# Patient Record
Sex: MALE | Race: WHITE | NOT HISPANIC OR LATINO | Employment: OTHER | URBAN - NONMETROPOLITAN AREA
[De-identification: names, ages, dates, MRNs, and addresses within clinical notes are randomized per-mention and may not be internally consistent; named-entity substitution may affect disease eponyms.]

---

## 2023-05-02 PROBLEM — M79.661 BILATERAL CALF PAIN: Status: ACTIVE | Noted: 2023-05-02

## 2023-05-02 PROBLEM — C61 MALIGNANT NEOPLASM OF PROSTATE (MULTI): Status: ACTIVE | Noted: 2023-05-02

## 2023-05-02 PROBLEM — H61.23 BILATERAL IMPACTED CERUMEN: Status: ACTIVE | Noted: 2023-05-02

## 2023-05-02 PROBLEM — E66.9 OBESITY: Status: ACTIVE | Noted: 2023-05-02

## 2023-05-02 PROBLEM — Z86.010 HISTORY OF ADENOMATOUS POLYP OF COLON: Status: ACTIVE | Noted: 2023-05-02

## 2023-05-02 PROBLEM — Z86.0101 HISTORY OF ADENOMATOUS POLYP OF COLON: Status: ACTIVE | Noted: 2023-05-02

## 2023-05-02 PROBLEM — E11.9 DM W/O COMPLICATION TYPE II (MULTI): Status: ACTIVE | Noted: 2023-05-02

## 2023-05-02 PROBLEM — E55.9 VITAMIN D DEFICIENCY: Status: ACTIVE | Noted: 2023-05-02

## 2023-05-02 PROBLEM — I25.10 MULTI-VESSEL CORONARY ARTERY STENOSIS: Status: ACTIVE | Noted: 2023-05-02

## 2023-05-02 PROBLEM — Z95.1 S/P CABG X 5: Status: ACTIVE | Noted: 2023-05-02

## 2023-05-02 PROBLEM — M79.662 BILATERAL CALF PAIN: Status: ACTIVE | Noted: 2023-05-02

## 2023-05-02 PROBLEM — S98.132A AMPUTATION OF TOE OF LEFT FOOT (CMS-HCC): Status: ACTIVE | Noted: 2023-05-02

## 2023-05-02 PROBLEM — N52.9 ED (ERECTILE DYSFUNCTION): Status: ACTIVE | Noted: 2023-05-02

## 2023-05-02 PROBLEM — T88.7XXA MEDICATION SIDE EFFECT: Status: ACTIVE | Noted: 2023-05-02

## 2023-05-02 PROBLEM — H91.90 HEARING LOSS: Status: ACTIVE | Noted: 2023-05-02

## 2023-05-02 PROBLEM — H91.93 DECREASED HEARING OF BOTH EARS: Status: ACTIVE | Noted: 2023-05-02

## 2023-05-02 PROBLEM — G25.0 ESSENTIAL TREMOR: Status: ACTIVE | Noted: 2023-05-02

## 2023-05-02 PROBLEM — I10 HYPERTENSION: Status: ACTIVE | Noted: 2023-05-02

## 2023-05-02 PROBLEM — B35.1 ONYCHOMYCOSIS: Status: ACTIVE | Noted: 2023-05-02

## 2023-05-02 PROBLEM — J30.9 ALLERGIC RHINITIS: Status: ACTIVE | Noted: 2023-05-02

## 2023-05-02 PROBLEM — N18.9 CRF (CHRONIC RENAL FAILURE): Status: ACTIVE | Noted: 2023-05-02

## 2023-05-02 PROBLEM — E78.5 HYPERLIPIDEMIA: Status: ACTIVE | Noted: 2023-05-02

## 2023-05-02 PROBLEM — E11.9 DIABETES MELLITUS (MULTI): Status: ACTIVE | Noted: 2023-05-02

## 2023-05-02 PROBLEM — R25.1 TREMOR: Status: ACTIVE | Noted: 2023-05-02

## 2023-05-02 RX ORDER — ACETAMINOPHEN 325 MG/1
TABLET ORAL EVERY 6 HOURS PRN
COMMUNITY
Start: 2020-03-08 | End: 2023-05-03 | Stop reason: ALTCHOICE

## 2023-05-02 RX ORDER — FLASH GLUCOSE SENSOR
KIT MISCELLANEOUS
COMMUNITY
Start: 2023-03-21

## 2023-05-02 RX ORDER — INSULIN LISPRO 100 [IU]/ML
INJECTION, SOLUTION INTRAVENOUS; SUBCUTANEOUS
COMMUNITY
Start: 2020-03-16 | End: 2023-08-09 | Stop reason: SDUPTHER

## 2023-05-02 RX ORDER — METOPROLOL TARTRATE 25 MG/1
TABLET, FILM COATED ORAL
COMMUNITY
Start: 2020-03-16 | End: 2023-08-09 | Stop reason: SDUPTHER

## 2023-05-02 RX ORDER — MULTIVITAMIN
TABLET ORAL
COMMUNITY

## 2023-05-02 RX ORDER — INSULIN GLARGINE 100 [IU]/ML
INJECTION, SOLUTION SUBCUTANEOUS
COMMUNITY
End: 2023-08-09 | Stop reason: SDUPTHER

## 2023-05-02 RX ORDER — LISINOPRIL 20 MG/1
TABLET ORAL
COMMUNITY
Start: 2020-03-16

## 2023-05-02 RX ORDER — ISOSORBIDE MONONITRATE 30 MG/1
TABLET, EXTENDED RELEASE ORAL
COMMUNITY
Start: 2020-03-08 | End: 2023-05-03 | Stop reason: ALTCHOICE

## 2023-05-02 RX ORDER — CEPHALEXIN 500 MG/1
500 CAPSULE ORAL 3 TIMES DAILY
COMMUNITY
Start: 2022-08-03 | End: 2023-05-03 | Stop reason: ALTCHOICE

## 2023-05-02 RX ORDER — METOPROLOL TARTRATE 100 MG/1
100 TABLET ORAL 2 TIMES DAILY
COMMUNITY
Start: 2020-04-01

## 2023-05-02 RX ORDER — POLYETHYLENE GLYCOL 3350 17 G/17G
17 POWDER, FOR SOLUTION ORAL
COMMUNITY
Start: 2020-03-08 | End: 2023-05-03 | Stop reason: ALTCHOICE

## 2023-05-02 RX ORDER — METFORMIN HYDROCHLORIDE 1000 MG/1
1 TABLET ORAL 2 TIMES DAILY
COMMUNITY
Start: 2019-12-11 | End: 2023-12-01

## 2023-05-02 RX ORDER — BISACODYL 10 MG/1
SUPPOSITORY RECTAL
COMMUNITY
Start: 2020-03-08 | End: 2023-05-03 | Stop reason: ALTCHOICE

## 2023-05-02 RX ORDER — ACETAMINOPHEN AND CODEINE PHOSPHATE 300; 30 MG/1; MG/1
TABLET ORAL
COMMUNITY
Start: 2023-01-25 | End: 2023-05-03 | Stop reason: ALTCHOICE

## 2023-05-02 RX ORDER — ACETAMINOPHEN 500 MG
TABLET ORAL
COMMUNITY

## 2023-05-02 RX ORDER — FUROSEMIDE 20 MG/1
1 TABLET ORAL DAILY
COMMUNITY
Start: 2020-03-16 | End: 2023-08-09 | Stop reason: SDUPTHER

## 2023-05-02 RX ORDER — CETIRIZINE HYDROCHLORIDE 10 MG/1
1 TABLET ORAL NIGHTLY
COMMUNITY
End: 2023-11-09 | Stop reason: SDUPTHER

## 2023-05-02 RX ORDER — IRON POLYSACCHARIDE COMPLEX 150 MG
CAPSULE ORAL
COMMUNITY
Start: 2020-03-08 | End: 2023-05-03 | Stop reason: ALTCHOICE

## 2023-05-02 RX ORDER — FUROSEMIDE 40 MG/1
1 TABLET ORAL DAILY
COMMUNITY

## 2023-05-02 RX ORDER — PRIMIDONE 50 MG/1
4 TABLET ORAL NIGHTLY
COMMUNITY
Start: 2019-10-16 | End: 2023-08-09 | Stop reason: ALTCHOICE

## 2023-05-02 RX ORDER — PEN NEEDLE, DIABETIC 32GX 5/32"
1 NEEDLE, DISPOSABLE MISCELLANEOUS
COMMUNITY
Start: 2023-03-27

## 2023-05-02 RX ORDER — CLOPIDOGREL BISULFATE 75 MG/1
1 TABLET ORAL DAILY
COMMUNITY
Start: 2013-01-04

## 2023-05-02 RX ORDER — DOCUSATE SODIUM 100 MG/1
CAPSULE, LIQUID FILLED ORAL
COMMUNITY

## 2023-05-02 RX ORDER — SODIUM FLUORIDE 6 MG/ML
PASTE, DENTIFRICE DENTAL
COMMUNITY
Start: 2022-10-11

## 2023-05-02 RX ORDER — ATORVASTATIN CALCIUM 80 MG/1
TABLET, FILM COATED ORAL
COMMUNITY
Start: 2020-03-08

## 2023-05-02 RX ORDER — INSULIN ASPART 100 [IU]/ML
INJECTION, SOLUTION INTRAVENOUS; SUBCUTANEOUS
COMMUNITY
Start: 2020-07-14 | End: 2024-02-11

## 2023-05-02 RX ORDER — DOXYCYCLINE 100 MG/1
CAPSULE ORAL
COMMUNITY
Start: 2022-08-24 | End: 2023-05-03 | Stop reason: ALTCHOICE

## 2023-05-03 ENCOUNTER — OFFICE VISIT (OUTPATIENT)
Dept: PRIMARY CARE | Facility: CLINIC | Age: 73
End: 2023-05-03
Payer: MEDICARE

## 2023-05-03 VITALS
OXYGEN SATURATION: 97 % | WEIGHT: 222.6 LBS | SYSTOLIC BLOOD PRESSURE: 112 MMHG | BODY MASS INDEX: 31.05 KG/M2 | DIASTOLIC BLOOD PRESSURE: 56 MMHG | HEART RATE: 64 BPM

## 2023-05-03 DIAGNOSIS — Z00.00 ANNUAL PHYSICAL EXAM: Primary | ICD-10-CM

## 2023-05-03 DIAGNOSIS — E11.40 TYPE 2 DIABETES MELLITUS WITH DIABETIC NEUROPATHY, WITHOUT LONG-TERM CURRENT USE OF INSULIN (MULTI): ICD-10-CM

## 2023-05-03 DIAGNOSIS — I11.0 HYPERTENSIVE HEART DISEASE WITH HEART FAILURE (MULTI): ICD-10-CM

## 2023-05-03 DIAGNOSIS — E55.9 VITAMIN D DEFICIENCY: ICD-10-CM

## 2023-05-03 DIAGNOSIS — Z85.528 PERSONAL HISTORY OF RENAL CANCER: ICD-10-CM

## 2023-05-03 DIAGNOSIS — G47.33 OBSTRUCTIVE SLEEP APNEA (ADULT) (PEDIATRIC): ICD-10-CM

## 2023-05-03 DIAGNOSIS — E78.5 HYPERLIPIDEMIA, UNSPECIFIED HYPERLIPIDEMIA TYPE: ICD-10-CM

## 2023-05-03 DIAGNOSIS — I25.10 MULTI-VESSEL CORONARY ARTERY STENOSIS: ICD-10-CM

## 2023-05-03 DIAGNOSIS — E66.09 CLASS 1 OBESITY DUE TO EXCESS CALORIES WITH SERIOUS COMORBIDITY AND BODY MASS INDEX (BMI) OF 31.0 TO 31.9 IN ADULT: ICD-10-CM

## 2023-05-03 DIAGNOSIS — C61 MALIGNANT NEOPLASM OF PROSTATE (MULTI): ICD-10-CM

## 2023-05-03 DIAGNOSIS — S98.132A AMPUTATION OF TOE OF LEFT FOOT (CMS-HCC): ICD-10-CM

## 2023-05-03 DIAGNOSIS — G25.0 ESSENTIAL TREMOR: ICD-10-CM

## 2023-05-03 PROBLEM — R53.1 WEAKNESS: Status: ACTIVE | Noted: 2020-03-16

## 2023-05-03 PROBLEM — R26.2 DIFFICULTY IN WALKING, NOT ELSEWHERE CLASSIFIED: Status: ACTIVE | Noted: 2020-03-16

## 2023-05-03 PROBLEM — L97.522 ULCER OF LEFT FOOT, WITH FAT LAYER EXPOSED (MULTI): Status: ACTIVE | Noted: 2021-07-07

## 2023-05-03 PROBLEM — L03.032 CELLULITIS OF LEFT TOE: Status: ACTIVE | Noted: 2021-07-14

## 2023-05-03 PROBLEM — L97.512: Status: ACTIVE | Noted: 2021-07-07

## 2023-05-03 PROBLEM — Z85.46 PERSONAL HISTORY OF MALIGNANT NEOPLASM OF PROSTATE: Status: ACTIVE | Noted: 2020-03-16

## 2023-05-03 PROBLEM — K21.9 GASTRO-ESOPHAGEAL REFLUX DISEASE WITHOUT ESOPHAGITIS: Status: ACTIVE | Noted: 2020-03-16

## 2023-05-03 PROBLEM — Z95.1 PRESENCE OF AORTOCORONARY BYPASS GRAFT: Status: ACTIVE | Noted: 2020-03-16

## 2023-05-03 PROCEDURE — 4010F ACE/ARB THERAPY RXD/TAKEN: CPT | Performed by: INTERNAL MEDICINE

## 2023-05-03 PROCEDURE — 3074F SYST BP LT 130 MM HG: CPT | Performed by: INTERNAL MEDICINE

## 2023-05-03 PROCEDURE — 1159F MED LIST DOCD IN RCRD: CPT | Performed by: INTERNAL MEDICINE

## 2023-05-03 PROCEDURE — 99397 PER PM REEVAL EST PAT 65+ YR: CPT | Performed by: INTERNAL MEDICINE

## 2023-05-03 PROCEDURE — 1157F ADVNC CARE PLAN IN RCRD: CPT | Performed by: INTERNAL MEDICINE

## 2023-05-03 PROCEDURE — 99215 OFFICE O/P EST HI 40 MIN: CPT | Performed by: INTERNAL MEDICINE

## 2023-05-03 PROCEDURE — 3008F BODY MASS INDEX DOCD: CPT | Performed by: INTERNAL MEDICINE

## 2023-05-03 PROCEDURE — 1036F TOBACCO NON-USER: CPT | Performed by: INTERNAL MEDICINE

## 2023-05-03 PROCEDURE — 1160F RVW MEDS BY RX/DR IN RCRD: CPT | Performed by: INTERNAL MEDICINE

## 2023-05-03 PROCEDURE — 3078F DIAST BP <80 MM HG: CPT | Performed by: INTERNAL MEDICINE

## 2023-05-03 RX ORDER — ASPIRIN 81 MG/1
81 TABLET ORAL DAILY
COMMUNITY
End: 2023-08-09 | Stop reason: SDUPTHER

## 2023-05-03 ASSESSMENT — ENCOUNTER SYMPTOMS: HYPERTENSION: 1

## 2023-05-03 NOTE — PROGRESS NOTES
Subjective   Patient ID: Gabo Weiner is a 73 y.o. male who presents for yearly physical / Diabetes Mellitus, Hyperlipidemia, Hypertension, and Foot Problem (Left big toe and 2nd toe amputation, discussion).  Hyperlipidemia    Hypertension    Yearly physical    Prostate 8-22  Colonoscopy 2019 recheck 2024  CT chest lung cancer screening n/a  immunizations rev'd  BMI 31    Follow up    CAD / cholesterol stable on rx no side effects  cardio following / Dr Wylie     Hand shaking tremor is worse on rx dose increased no side effects  neuro following / Dr Schroeder     DM stable on rx no side effects  FBS 95 this am  neuropathy, microalbuminuria  check blood sugars per endo  endo following  / Dr Moncada  podiatry following / Dr Tavares left foot 2nd toe amputated due to PVD  Had foot vs opened / vasc following     hypertension stable on rx no side effects     vit D stable on rx no side effects     renal cancer and prostate cancer stable  urology following / Dr Paul     diet / exercise rev'd    Review of Systems   All other systems reviewed and are negative.      Objective   /56   Pulse 64   Wt 101 kg (222 lb 9.6 oz)   SpO2 97%   BMI 31.05 kg/m²   Lab Results   Component Value Date    WBC 8.4 08/08/2022    HGB 16.7 08/08/2022    HCT 49.4 08/08/2022     08/08/2022    CHOL 122 08/08/2022    TRIG 191 (H) 08/08/2022    HDL 36.0 (A) 08/08/2022    ALT 21 08/08/2022    AST 21 08/08/2022     08/08/2022    K 3.9 08/08/2022     08/08/2022    CREATININE 0.84 08/08/2022    BUN 18 08/08/2022    CO2 29 08/08/2022    TSH 1.37 08/08/2022    INR 1.1 03/08/2020    HGBA1C 6.8 (A) 08/08/2022    ALBUR 139.3 12/15/2017           Physical Exam  Vitals reviewed.   Constitutional:       Appearance: Normal appearance. He is obese.   HENT:      Head: Normocephalic and atraumatic.      Mouth/Throat:      Pharynx: No posterior oropharyngeal erythema.   Eyes:      General: No scleral icterus.     Conjunctiva/sclera:  Conjunctivae normal.      Pupils: Pupils are equal, round, and reactive to light.   Cardiovascular:      Rate and Rhythm: Normal rate and regular rhythm.      Heart sounds: Normal heart sounds.   Pulmonary:      Effort: No respiratory distress.      Breath sounds: No wheezing.   Abdominal:      General: Abdomen is flat. Bowel sounds are normal. There is no distension.      Palpations: Abdomen is soft. There is no mass.      Tenderness: There is no abdominal tenderness. There is no rebound.   Musculoskeletal:         General: Normal range of motion.      Cervical back: Normal range of motion and neck supple.      Comments: Left foot 2 nd toe amputated   Clean and dry    DP 1 +  warm   Skin:     General: Skin is warm and dry.   Neurological:      General: No focal deficit present.      Mental Status: He is alert and oriented to person, place, and time. Mental status is at baseline.   Psychiatric:         Mood and Affect: Mood normal.         Behavior: Behavior normal.         Thought Content: Thought content normal.         Judgment: Judgment normal.         Problem List Items Addressed This Visit          Nervous    Essential tremor    Obstructive sleep apnea (adult) (pediatric)    Type 2 diabetes mellitus with diabetic neuropathy, without long-term current use of insulin (CMS/[facility])    Relevant Orders    Albumin , Urine Random    Hemoglobin A1C       Circulatory    Multi-vessel coronary artery stenosis    Hypertensive heart disease with heart failure (CMS/HCC)       Genitourinary    Malignant neoplasm of prostate (CMS/HCC)       Musculoskeletal    Amputation of toe of left foot (CMS/[facility])       Endocrine/Metabolic    Obesity    Vitamin D deficiency    Relevant Orders    Vitamin D 25 hydroxy       Other    Hyperlipidemia, unspecified    Relevant Orders    Comprehensive Metabolic Panel    Lipid Panel    TSH with reflex to Free T4 if abnormal     Other Visit Diagnoses       Annual physical exam    -  Primary    Personal  history of renal cancer        Relevant Orders    CBC and Auto Differential          Assessment/Plan     Yearly physical    Prostate 8-22  Colonoscopy 2019 recheck 2024  CT chest lung cancer screening n/a  immunizations rev'd  BMI 31    Follow up    CAD / cholesterol stable on rx no side effects  cardio following / Dr Wylie     Hand shaking tremor is worse on rx dose increased no side effects  neuro following / Dr Schroeder     DM stable on rx no side effects  FBS 95 this am  neuropathy, microalbuminuria  check blood sugars per endo  endo following  / Dr Moncada  podiatry following / Dr Tavares left foot 2nd toe amputated due to PVD  Had foot vs opened / vasc following     hypertension stable on rx no side effects     vit D stable on rx no side effects     renal cancer and prostate cancer stable  urology following / Dr Paul     diet / exercise rev'd    Check labs before appt  Prostate in 8-23    Follow up 3 months / annual wellness

## 2023-07-25 PROBLEM — E11.9 TYPE 2 DIABETES MELLITUS WITHOUT COMPLICATIONS (MULTI): Status: ACTIVE | Noted: 2020-03-16

## 2023-07-25 RX ORDER — ASPIRIN 81 MG/1
TABLET ORAL EVERY 24 HOURS
COMMUNITY
Start: 2013-09-06

## 2023-07-25 RX ORDER — AMOXICILLIN AND CLAVULANATE POTASSIUM 875; 125 MG/1; MG/1
1 TABLET, FILM COATED ORAL
COMMUNITY
Start: 2023-03-15 | End: 2023-08-09 | Stop reason: ALTCHOICE

## 2023-07-25 RX ORDER — CHOLECALCIFEROL (VITAMIN D3) 50 MCG
TABLET ORAL
COMMUNITY
End: 2023-08-09 | Stop reason: SDUPTHER

## 2023-07-31 ENCOUNTER — LAB (OUTPATIENT)
Dept: LAB | Facility: LAB | Age: 73
End: 2023-07-31
Payer: MEDICARE

## 2023-07-31 DIAGNOSIS — E55.9 VITAMIN D DEFICIENCY: ICD-10-CM

## 2023-07-31 DIAGNOSIS — E11.40 TYPE 2 DIABETES MELLITUS WITH DIABETIC NEUROPATHY, WITHOUT LONG-TERM CURRENT USE OF INSULIN (MULTI): ICD-10-CM

## 2023-07-31 DIAGNOSIS — Z85.528 PERSONAL HISTORY OF RENAL CANCER: ICD-10-CM

## 2023-07-31 DIAGNOSIS — E78.5 HYPERLIPIDEMIA, UNSPECIFIED HYPERLIPIDEMIA TYPE: ICD-10-CM

## 2023-07-31 LAB
ALANINE AMINOTRANSFERASE (SGPT) (U/L) IN SER/PLAS: 25 U/L (ref 10–52)
ALBUMIN (G/DL) IN SER/PLAS: 4.2 G/DL (ref 3.4–5)
ALBUMIN (MG/L) IN URINE: 21.6 MG/L
ALBUMIN/CREATININE (UG/MG) IN URINE: 36.2 UG/MG CRT (ref 0–30)
ALKALINE PHOSPHATASE (U/L) IN SER/PLAS: 76 U/L (ref 33–136)
ANION GAP IN SER/PLAS: 11 MMOL/L (ref 10–20)
ASPARTATE AMINOTRANSFERASE (SGOT) (U/L) IN SER/PLAS: 22 U/L (ref 9–39)
BASOPHILS (10*3/UL) IN BLOOD BY AUTOMATED COUNT: 0.11 X10E9/L (ref 0–0.1)
BASOPHILS/100 LEUKOCYTES IN BLOOD BY AUTOMATED COUNT: 1.3 % (ref 0–2)
BILIRUBIN TOTAL (MG/DL) IN SER/PLAS: 0.5 MG/DL (ref 0–1.2)
CALCIUM (MG/DL) IN SER/PLAS: 9.7 MG/DL (ref 8.6–10.3)
CARBON DIOXIDE, TOTAL (MMOL/L) IN SER/PLAS: 31 MMOL/L (ref 21–32)
CHLORIDE (MMOL/L) IN SER/PLAS: 105 MMOL/L (ref 98–107)
CHOLESTEROL (MG/DL) IN SER/PLAS: 138 MG/DL (ref 0–199)
CHOLESTEROL IN HDL (MG/DL) IN SER/PLAS: 47.2 MG/DL
CHOLESTEROL/HDL RATIO: 2.9
CREATININE (MG/DL) IN SER/PLAS: 0.76 MG/DL (ref 0.5–1.3)
CREATININE (MG/DL) IN URINE: 59.7 MG/DL (ref 20–370)
EOSINOPHILS (10*3/UL) IN BLOOD BY AUTOMATED COUNT: 0.97 X10E9/L (ref 0–0.4)
EOSINOPHILS/100 LEUKOCYTES IN BLOOD BY AUTOMATED COUNT: 11.8 % (ref 0–6)
ERYTHROCYTE DISTRIBUTION WIDTH (RATIO) BY AUTOMATED COUNT: 21.1 % (ref 11.5–14.5)
ERYTHROCYTE MEAN CORPUSCULAR HEMOGLOBIN CONCENTRATION (G/DL) BY AUTOMATED: 30.1 G/DL (ref 32–36)
ERYTHROCYTE MEAN CORPUSCULAR VOLUME (FL) BY AUTOMATED COUNT: 89 FL (ref 80–100)
ERYTHROCYTES (10*6/UL) IN BLOOD BY AUTOMATED COUNT: 5.06 X10E12/L (ref 4.5–5.9)
GFR MALE: >90 ML/MIN/1.73M2
GLUCOSE (MG/DL) IN SER/PLAS: 160 MG/DL (ref 74–99)
HEMATOCRIT (%) IN BLOOD BY AUTOMATED COUNT: 44.9 % (ref 41–52)
HEMOGLOBIN (G/DL) IN BLOOD: 13.5 G/DL (ref 13.5–17.5)
IMMATURE GRANULOCYTES/100 LEUKOCYTES IN BLOOD BY AUTOMATED COUNT: 0.5 % (ref 0–0.9)
LDL: 61 MG/DL (ref 0–99)
LEUKOCYTES (10*3/UL) IN BLOOD BY AUTOMATED COUNT: 8.2 X10E9/L (ref 4.4–11.3)
LYMPHOCYTES (10*3/UL) IN BLOOD BY AUTOMATED COUNT: 1.54 X10E9/L (ref 0.8–3)
LYMPHOCYTES/100 LEUKOCYTES IN BLOOD BY AUTOMATED COUNT: 18.7 % (ref 13–44)
MONOCYTES (10*3/UL) IN BLOOD BY AUTOMATED COUNT: 0.57 X10E9/L (ref 0.05–0.8)
MONOCYTES/100 LEUKOCYTES IN BLOOD BY AUTOMATED COUNT: 6.9 % (ref 2–10)
NEUTROPHILS (10*3/UL) IN BLOOD BY AUTOMATED COUNT: 5 X10E9/L (ref 1.6–5.5)
NEUTROPHILS/100 LEUKOCYTES IN BLOOD BY AUTOMATED COUNT: 60.8 % (ref 40–80)
PLATELETS (10*3/UL) IN BLOOD AUTOMATED COUNT: 190 X10E9/L (ref 150–450)
POTASSIUM (MMOL/L) IN SER/PLAS: 4.2 MMOL/L (ref 3.5–5.3)
PROTEIN TOTAL: 6.8 G/DL (ref 6.4–8.2)
SODIUM (MMOL/L) IN SER/PLAS: 143 MMOL/L (ref 136–145)
THYROTROPIN (MIU/L) IN SER/PLAS BY DETECTION LIMIT <= 0.05 MIU/L: 1.42 MIU/L (ref 0.44–3.98)
TRIGLYCERIDE (MG/DL) IN SER/PLAS: 147 MG/DL (ref 0–149)
UREA NITROGEN (MG/DL) IN SER/PLAS: 18 MG/DL (ref 6–23)
VLDL: 29 MG/DL (ref 0–40)

## 2023-07-31 PROCEDURE — 85025 COMPLETE CBC W/AUTO DIFF WBC: CPT

## 2023-07-31 PROCEDURE — 36415 COLL VENOUS BLD VENIPUNCTURE: CPT

## 2023-07-31 PROCEDURE — 82570 ASSAY OF URINE CREATININE: CPT

## 2023-07-31 PROCEDURE — 80053 COMPREHEN METABOLIC PANEL: CPT

## 2023-07-31 PROCEDURE — 80061 LIPID PANEL: CPT

## 2023-07-31 PROCEDURE — 82306 VITAMIN D 25 HYDROXY: CPT

## 2023-07-31 PROCEDURE — 83036 HEMOGLOBIN GLYCOSYLATED A1C: CPT

## 2023-07-31 PROCEDURE — 84443 ASSAY THYROID STIM HORMONE: CPT

## 2023-07-31 PROCEDURE — 82043 UR ALBUMIN QUANTITATIVE: CPT

## 2023-08-01 LAB
CALCIDIOL (25 OH VITAMIN D3) (NG/ML) IN SER/PLAS: 47 NG/ML
ESTIMATED AVERAGE GLUCOSE FOR HBA1C: 163 MG/DL
HEMOGLOBIN A1C/HEMOGLOBIN TOTAL IN BLOOD: 7.3 %

## 2023-08-03 PROBLEM — I73.9 PERIPHERAL VASCULAR DISEASE (CMS-HCC): Status: ACTIVE | Noted: 2023-06-22

## 2023-08-09 ENCOUNTER — OFFICE VISIT (OUTPATIENT)
Dept: PRIMARY CARE | Facility: CLINIC | Age: 73
End: 2023-08-09
Payer: MEDICARE

## 2023-08-09 VITALS
HEART RATE: 69 BPM | DIASTOLIC BLOOD PRESSURE: 68 MMHG | WEIGHT: 222.6 LBS | SYSTOLIC BLOOD PRESSURE: 128 MMHG | OXYGEN SATURATION: 98 % | BODY MASS INDEX: 31.05 KG/M2

## 2023-08-09 DIAGNOSIS — E55.9 VITAMIN D DEFICIENCY: ICD-10-CM

## 2023-08-09 DIAGNOSIS — R25.1 TREMOR: ICD-10-CM

## 2023-08-09 DIAGNOSIS — I73.9 PVD (PERIPHERAL VASCULAR DISEASE) (CMS-HCC): Primary | ICD-10-CM

## 2023-08-09 DIAGNOSIS — E78.00 HYPERCHOLESTEREMIA: ICD-10-CM

## 2023-08-09 DIAGNOSIS — Z79.4 TYPE 2 DIABETES MELLITUS WITHOUT COMPLICATION, WITH LONG-TERM CURRENT USE OF INSULIN (MULTI): ICD-10-CM

## 2023-08-09 DIAGNOSIS — Z95.1 S/P CABG X 5: ICD-10-CM

## 2023-08-09 DIAGNOSIS — E66.09 CLASS 1 OBESITY DUE TO EXCESS CALORIES WITH SERIOUS COMORBIDITY AND BODY MASS INDEX (BMI) OF 31.0 TO 31.9 IN ADULT: ICD-10-CM

## 2023-08-09 DIAGNOSIS — Z85.528 PERSONAL HISTORY OF RENAL CANCER: ICD-10-CM

## 2023-08-09 DIAGNOSIS — I11.0 HYPERTENSIVE HEART DISEASE WITH HEART FAILURE (MULTI): ICD-10-CM

## 2023-08-09 DIAGNOSIS — I25.810 CORONARY ARTERY DISEASE INVOLVING CORONARY BYPASS GRAFT OF NATIVE HEART WITHOUT ANGINA PECTORIS: ICD-10-CM

## 2023-08-09 DIAGNOSIS — C61 MALIGNANT NEOPLASM OF PROSTATE (MULTI): ICD-10-CM

## 2023-08-09 DIAGNOSIS — E11.9 TYPE 2 DIABETES MELLITUS WITHOUT COMPLICATION, WITH LONG-TERM CURRENT USE OF INSULIN (MULTI): ICD-10-CM

## 2023-08-09 PROCEDURE — 3008F BODY MASS INDEX DOCD: CPT | Performed by: INTERNAL MEDICINE

## 2023-08-09 PROCEDURE — 4010F ACE/ARB THERAPY RXD/TAKEN: CPT | Performed by: INTERNAL MEDICINE

## 2023-08-09 PROCEDURE — G0446 INTENS BEHAVE THER CARDIO DX: HCPCS | Performed by: INTERNAL MEDICINE

## 2023-08-09 PROCEDURE — 3051F HG A1C>EQUAL 7.0%<8.0%: CPT | Performed by: INTERNAL MEDICINE

## 2023-08-09 PROCEDURE — 3078F DIAST BP <80 MM HG: CPT | Performed by: INTERNAL MEDICINE

## 2023-08-09 PROCEDURE — 99214 OFFICE O/P EST MOD 30 MIN: CPT | Performed by: INTERNAL MEDICINE

## 2023-08-09 PROCEDURE — 1160F RVW MEDS BY RX/DR IN RCRD: CPT | Performed by: INTERNAL MEDICINE

## 2023-08-09 PROCEDURE — 1157F ADVNC CARE PLAN IN RCRD: CPT | Performed by: INTERNAL MEDICINE

## 2023-08-09 PROCEDURE — 3074F SYST BP LT 130 MM HG: CPT | Performed by: INTERNAL MEDICINE

## 2023-08-09 PROCEDURE — 1036F TOBACCO NON-USER: CPT | Performed by: INTERNAL MEDICINE

## 2023-08-09 PROCEDURE — 1159F MED LIST DOCD IN RCRD: CPT | Performed by: INTERNAL MEDICINE

## 2023-08-09 RX ORDER — PRIMIDONE 50 MG/1
200 TABLET ORAL 4 TIMES DAILY
COMMUNITY
Start: 2023-08-09 | End: 2024-04-12

## 2023-08-09 RX ORDER — METOPROLOL TARTRATE 25 MG/1
25 TABLET, FILM COATED ORAL 2 TIMES DAILY
Qty: 60 TABLET | Refills: 0 | COMMUNITY
Start: 2023-08-09

## 2023-08-09 ASSESSMENT — ENCOUNTER SYMPTOMS: HYPERTENSION: 1

## 2023-08-09 NOTE — PROGRESS NOTES
Subjective   Patient ID: Gabo Weiner is a 73 y.o. male who presents for Med Management, Diabetes Mellitus, Hyperlipidemia, Hypertension, and Results (Lab review/).  Hyperlipidemia    Hypertension    Routine follow up    Labs rev'd    Left foot rest of toes amputated (no toes now)  Healed well  Podiatry following    CAD / cholesterol stable on rx no side effects  cardio following / Dr Wylie     Hand shaking tremor is worse on rx dose increased no side effects  neuro following / Dr Schroeder     DM stable on rx no side effects   this am  neuropathy, microalbuminuria  check blood sugars per endo  endo following  / Dr Moncada  podiatry following / Dr Tavares  Had left leg balloon angioplasty / vasc following ( Dr Neves)     hypertension stable on rx no side effects     vit D stable on rx no side effects     renal cancer and prostate cancer stable  urology following / Dr Paul     diet / exercise rev'd    Prostate in 8-23    Review of Systems   All other systems reviewed and are negative.      Objective   /68   Pulse 69   Wt 101 kg (222 lb 9.6 oz)   SpO2 98%   BMI 31.05 kg/m²   Lab Results   Component Value Date    WBC 8.2 07/31/2023    HGB 13.5 07/31/2023    HCT 44.9 07/31/2023     07/31/2023    CHOL 138 07/31/2023    TRIG 147 07/31/2023    HDL 47.2 07/31/2023    ALT 25 07/31/2023    AST 22 07/31/2023     07/31/2023    K 4.2 07/31/2023     07/31/2023    CREATININE 0.76 07/31/2023    BUN 18 07/31/2023    CO2 31 07/31/2023    TSH 1.42 07/31/2023    INR 1.1 03/08/2020    HGBA1C 7.3 (A) 07/31/2023    ALBUR 139.3 12/15/2017           Physical Exam  Vitals reviewed.   Constitutional:       Appearance: Normal appearance. He is obese.   HENT:      Head: Normocephalic and atraumatic.      Mouth/Throat:      Pharynx: No posterior oropharyngeal erythema.   Eyes:      General: No scleral icterus.     Conjunctiva/sclera: Conjunctivae normal.      Pupils: Pupils are equal, round, and reactive to  light.   Cardiovascular:      Rate and Rhythm: Normal rate and regular rhythm.      Heart sounds: Normal heart sounds.   Pulmonary:      Effort: No respiratory distress.      Breath sounds: No wheezing.   Abdominal:      General: Abdomen is flat. Bowel sounds are normal. There is no distension.      Palpations: Abdomen is soft. There is no mass.      Tenderness: There is no abdominal tenderness. There is no rebound.   Musculoskeletal:         General: Normal range of motion.      Cervical back: Normal range of motion and neck supple.      Comments: Left foot incision healed    no toes DP 2+/=   Skin:     General: Skin is warm and dry.   Neurological:      General: No focal deficit present.      Mental Status: He is alert and oriented to person, place, and time. Mental status is at baseline.   Psychiatric:         Mood and Affect: Mood normal.         Behavior: Behavior normal.         Thought Content: Thought content normal.         Judgment: Judgment normal.         Problem List Items Addressed This Visit          Cardiac and Vasculature    S/P CABG x 5    CAD (coronary artery disease)    Relevant Medications    metoprolol tartrate (Lopressor) 25 mg tablet    Hypertensive heart disease with heart failure (CMS/HCC)    Relevant Medications    metoprolol tartrate (Lopressor) 25 mg tablet       Endocrine/Metabolic    Type 2 diabetes mellitus without complications (CMS/HCC)    Obese    Vitamin D deficiency       Neuro    Tremor    Relevant Medications    primidone (Mysoline) 50 mg tablet     Other Visit Diagnoses       PVD (peripheral vascular disease) (CMS/HCC)    -  Primary    Hypercholesteremia        Malignant neoplasm of prostate (CMS/HCC)        Personal history of renal cancer              Assessment/Plan     Labs rev'd    Left foot rest of toes amputated (no toes now)  Healed well  Podiatry following    CAD / cholesterol stable on rx no side effects  cardio following / Dr Wylie  I spent 15 minutes face-to-face  with this individual discussing their cardiovascular risk and behavioral therapies of nutritional choices, exercise, and elimination of habits contributing to risk. We agreed on plan how they may be able to reduce their current cardiovascular risk.  Patient 10 year cardiac risk estimate calculates :   34  %      Hand shaking tremor is worse on rx dose increased no side effects  neuro following / Dr Schroeder     DM stable on rx no side effects   this am  neuropathy, microalbuminuria  check blood sugars per endo  endo following  / Dr Moncada  podiatry following / Dr Tavares  Had left leg balloon angioplasty / vasc following ( Dr Neves)  Follow up ophtho     hypertension stable on rx no side effects     vit D stable on rx no side effects     renal cancer and prostate cancer stable  urology following / Dr Paul     diet / exercise rev'd    Prostate in 8-23      Prostate 8-22  Colonoscopy 2019 recheck 2024  CT chest lung cancer screening n/a  immunizations rev'd  BMI 31    Medicare physical 2-23    Follow up 3 months

## 2023-10-12 ENCOUNTER — TELEPHONE (OUTPATIENT)
Dept: ENDOCRINOLOGY | Facility: CLINIC | Age: 73
End: 2023-10-12
Payer: MEDICARE

## 2023-10-12 NOTE — TELEPHONE ENCOUNTER
Called and left a message that [employer] were awaiting pt  and provided the address on 10/12/23 at 10:34 am.

## 2023-11-03 PROBLEM — D62 ANEMIA DUE TO ACUTE BLOOD LOSS: Status: ACTIVE | Noted: 2023-11-03

## 2023-11-03 PROBLEM — E11.9 DIABETES MELLITUS, TYPE 2 (MULTI): Status: ACTIVE | Noted: 2023-11-03

## 2023-11-03 PROBLEM — R73.09 BLOOD GLUCOSE ABNORMAL: Status: ACTIVE | Noted: 2023-11-03

## 2023-11-03 PROBLEM — I10 BENIGN ESSENTIAL HYPERTENSION: Status: ACTIVE | Noted: 2023-11-03

## 2023-11-03 PROBLEM — M79.673 FOOT PAIN: Status: ACTIVE | Noted: 2023-11-03

## 2023-11-03 PROBLEM — M86.60 CHRONIC OSTEOMYELITIS (MULTI): Status: ACTIVE | Noted: 2023-11-03

## 2023-11-03 PROBLEM — M86.179 ACUTE OSTEOMYELITIS OF ANKLE OR FOOT (MULTI): Status: ACTIVE | Noted: 2023-11-03

## 2023-11-03 PROBLEM — L08.9 INFECTION OF TOE: Status: ACTIVE | Noted: 2023-11-03

## 2023-11-03 PROBLEM — I50.9 HEART FAILURE (MULTI): Status: ACTIVE | Noted: 2023-11-03

## 2023-11-03 RX ORDER — INSULIN GLARGINE 100 [IU]/ML
35 INJECTION, SOLUTION SUBCUTANEOUS DAILY
COMMUNITY
End: 2023-11-09 | Stop reason: ALTCHOICE

## 2023-11-03 RX ORDER — INSULIN LISPRO 100 [IU]/ML
6 INJECTION, SOLUTION INTRAVENOUS; SUBCUTANEOUS
COMMUNITY
End: 2024-02-05 | Stop reason: ALTCHOICE

## 2023-11-09 ENCOUNTER — OFFICE VISIT (OUTPATIENT)
Dept: PRIMARY CARE | Facility: CLINIC | Age: 73
End: 2023-11-09
Payer: MEDICARE

## 2023-11-09 ENCOUNTER — LAB (OUTPATIENT)
Dept: LAB | Facility: LAB | Age: 73
End: 2023-11-09
Payer: MEDICARE

## 2023-11-09 VITALS
OXYGEN SATURATION: 94 % | HEART RATE: 66 BPM | SYSTOLIC BLOOD PRESSURE: 134 MMHG | WEIGHT: 226.8 LBS | BODY MASS INDEX: 31.63 KG/M2 | DIASTOLIC BLOOD PRESSURE: 68 MMHG

## 2023-11-09 DIAGNOSIS — Z12.5 SCREENING FOR PROSTATE CANCER: ICD-10-CM

## 2023-11-09 DIAGNOSIS — J30.9 ALLERGIC RHINITIS, UNSPECIFIED SEASONALITY, UNSPECIFIED TRIGGER: ICD-10-CM

## 2023-11-09 DIAGNOSIS — E11.9 TYPE 2 DIABETES MELLITUS WITHOUT COMPLICATION, WITH LONG-TERM CURRENT USE OF INSULIN (MULTI): Primary | ICD-10-CM

## 2023-11-09 DIAGNOSIS — Z85.528 PERSONAL HISTORY OF RENAL CANCER: ICD-10-CM

## 2023-11-09 DIAGNOSIS — E78.00 HYPERCHOLESTEREMIA: ICD-10-CM

## 2023-11-09 DIAGNOSIS — C61 MALIGNANT NEOPLASM OF PROSTATE (MULTI): ICD-10-CM

## 2023-11-09 DIAGNOSIS — I11.0 HYPERTENSIVE HEART DISEASE WITH HEART FAILURE (MULTI): ICD-10-CM

## 2023-11-09 DIAGNOSIS — Z79.4 TYPE 2 DIABETES MELLITUS WITHOUT COMPLICATION, WITH LONG-TERM CURRENT USE OF INSULIN (MULTI): Primary | ICD-10-CM

## 2023-11-09 DIAGNOSIS — E55.9 VITAMIN D DEFICIENCY: ICD-10-CM

## 2023-11-09 DIAGNOSIS — I25.810 CORONARY ARTERY DISEASE INVOLVING CORONARY BYPASS GRAFT OF NATIVE HEART WITHOUT ANGINA PECTORIS: ICD-10-CM

## 2023-11-09 DIAGNOSIS — E66.09 CLASS 1 OBESITY DUE TO EXCESS CALORIES WITH SERIOUS COMORBIDITY AND BODY MASS INDEX (BMI) OF 31.0 TO 31.9 IN ADULT: ICD-10-CM

## 2023-11-09 DIAGNOSIS — R25.1 TREMOR: ICD-10-CM

## 2023-11-09 PROBLEM — M86.679 CHRONIC OSTEOMYELITIS INVOLVING ANKLE AND FOOT (MULTI): Status: ACTIVE | Noted: 2023-11-09

## 2023-11-09 PROBLEM — E11.621 DIABETIC FOOT ULCER (MULTI): Status: ACTIVE | Noted: 2023-11-09

## 2023-11-09 PROBLEM — E11.51 TYPE 2 DIABETES MELLITUS WITH PERIPHERAL ANGIOPATHY (MULTI): Status: ACTIVE | Noted: 2023-11-09

## 2023-11-09 PROBLEM — A49.02 METHICILLIN RESISTANT STAPHYLOCOCCUS AUREUS INFECTION: Status: ACTIVE | Noted: 2023-11-09

## 2023-11-09 PROBLEM — L97.509 DIABETIC FOOT ULCER (MULTI): Status: ACTIVE | Noted: 2023-11-09

## 2023-11-09 PROCEDURE — 3078F DIAST BP <80 MM HG: CPT | Performed by: INTERNAL MEDICINE

## 2023-11-09 PROCEDURE — 4010F ACE/ARB THERAPY RXD/TAKEN: CPT | Performed by: INTERNAL MEDICINE

## 2023-11-09 PROCEDURE — 1036F TOBACCO NON-USER: CPT | Performed by: INTERNAL MEDICINE

## 2023-11-09 PROCEDURE — 1160F RVW MEDS BY RX/DR IN RCRD: CPT | Performed by: INTERNAL MEDICINE

## 2023-11-09 PROCEDURE — 3008F BODY MASS INDEX DOCD: CPT | Performed by: INTERNAL MEDICINE

## 2023-11-09 PROCEDURE — 3075F SYST BP GE 130 - 139MM HG: CPT | Performed by: INTERNAL MEDICINE

## 2023-11-09 PROCEDURE — 99214 OFFICE O/P EST MOD 30 MIN: CPT | Performed by: INTERNAL MEDICINE

## 2023-11-09 PROCEDURE — 1159F MED LIST DOCD IN RCRD: CPT | Performed by: INTERNAL MEDICINE

## 2023-11-09 PROCEDURE — 36415 COLL VENOUS BLD VENIPUNCTURE: CPT

## 2023-11-09 PROCEDURE — 3051F HG A1C>EQUAL 7.0%<8.0%: CPT | Performed by: INTERNAL MEDICINE

## 2023-11-09 PROCEDURE — G0103 PSA SCREENING: HCPCS

## 2023-11-09 RX ORDER — CETIRIZINE HYDROCHLORIDE 10 MG/1
10 TABLET ORAL NIGHTLY
Qty: 30 TABLET | Refills: 2 | Status: SHIPPED | OUTPATIENT
Start: 2023-11-09 | End: 2024-02-06 | Stop reason: SDUPTHER

## 2023-11-09 ASSESSMENT — ENCOUNTER SYMPTOMS: HYPERTENSION: 1

## 2023-11-09 NOTE — PROGRESS NOTES
Subjective   Patient ID: Gabo Weiner is a 73 y.o. male who presents for Med Management, Hypertension, Hyperlipidemia, and Diabetes Mellitus.  Hypertension    Hyperlipidemia    Routine follow up    Left foot rest of toes amputated (no toes now)  Healed well  Podiatry following     CAD / cholesterol stable on rx no side effects  cardio following / Dr Wylie     Hand shaking tremor is worse on rx dose increased no side effects  neuro following / Dr Schroeder     DM stable on rx no side effects    this am  neuropathy, microalbuminuria  check blood sugars per endo  endo following  / Dr Moncada  podiatry following / Dr Tavares  Had left leg balloon angioplasty / vasc following ( Dr Neves)  Follow up ophtho     hypertension stable on rx no side effects     vit D stable on rx no side effects     renal cancer and prostate cancer stable  Urology released     diet / exercise rev'd    Review of Systems   All other systems reviewed and are negative.      Objective   /68   Pulse 66   Wt 103 kg (226 lb 12.8 oz)   SpO2 94%   BMI 31.63 kg/m²   Lab Results   Component Value Date    WBC 8.2 07/31/2023    HGB 13.5 07/31/2023    HCT 44.9 07/31/2023     07/31/2023    CHOL 138 07/31/2023    TRIG 147 07/31/2023    HDL 47.2 07/31/2023    ALT 25 07/31/2023    AST 22 07/31/2023     07/31/2023    K 4.2 07/31/2023     07/31/2023    CREATININE 0.76 07/31/2023    BUN 18 07/31/2023    CO2 31 07/31/2023    TSH 1.42 07/31/2023    INR 1.0 07/10/2023    HGBA1C 7.3 (A) 07/31/2023    ALBUR 139.3 12/15/2017           Physical Exam  Vitals reviewed.   Constitutional:       Appearance: Normal appearance. He is obese.   HENT:      Head: Normocephalic and atraumatic.      Mouth/Throat:      Pharynx: No posterior oropharyngeal erythema.   Eyes:      General: No scleral icterus.     Conjunctiva/sclera: Conjunctivae normal.      Pupils: Pupils are equal, round, and reactive to light.   Cardiovascular:      Rate and Rhythm:  Normal rate and regular rhythm.      Heart sounds: Normal heart sounds.   Pulmonary:      Effort: No respiratory distress.      Breath sounds: No wheezing.   Abdominal:      General: Abdomen is flat. Bowel sounds are normal. There is no distension.      Palpations: Abdomen is soft. There is no mass.      Tenderness: There is no abdominal tenderness. There is no rebound.   Musculoskeletal:         General: Normal range of motion.      Cervical back: Normal range of motion and neck supple.   Skin:     General: Skin is warm and dry.   Neurological:      General: No focal deficit present.      Mental Status: He is alert and oriented to person, place, and time. Mental status is at baseline.   Psychiatric:         Mood and Affect: Mood normal.         Behavior: Behavior normal.         Thought Content: Thought content normal.         Judgment: Judgment normal.       Problem List Items Addressed This Visit             ICD-10-CM       Cardiac and Vasculature    CAD (coronary artery disease) I25.10    Hypertensive heart disease with heart failure (CMS/HCC) I11.0       Endocrine/Metabolic    Diabetes mellitus (CMS/[facility]) - Primary E11.9    Obesity E66.9    Vitamin D deficiency E55.9       Neuro    Tremor R25.1     Other Visit Diagnoses         Codes    Hypercholesteremia     E78.00    Malignant neoplasm of prostate (CMS/[facility])     C61    Screening for prostate cancer     Z12.5    Relevant Orders    PSA    Personal history of renal cancer     Z85.528          Assessment/Plan     Left foot rest of toes amputated (no toes now)  Healed well  Podiatry following     CAD / cholesterol stable on rx no side effects  cardio following / Dr Wylie     Hand shaking tremor is worse on rx dose increased no side effects  neuro following / Dr Schroeder     DM stable on rx no side effects    this am  neuropathy, microalbuminuria  check blood sugars per endo  endo following  / Dr Moncada  podiatry following / Dr Tavares  Had left leg balloon  angioplasty / vasc following ( Dr Neves)  Follow up ophtho     hypertension stable on rx no side effects     vit D stable on rx no side effects     renal cancer and prostate cancer stable  urology released  Check PSA today     diet / exercise rev'd        Prostate 8-22  Colonoscopy 2019 recheck 2024  CT chest lung cancer screening n/a  immunizations rev'd  BMI 31.6    Follow up 3 months / medicare physical

## 2023-11-10 LAB — PSA SERPL-MCNC: 0.16 NG/ML

## 2023-11-30 DIAGNOSIS — E11.9 TYPE 2 DIABETES MELLITUS WITHOUT COMPLICATIONS (MULTI): ICD-10-CM

## 2023-12-01 RX ORDER — METFORMIN HYDROCHLORIDE 1000 MG/1
1000 TABLET ORAL 2 TIMES DAILY
Qty: 180 TABLET | Refills: 3 | Status: SHIPPED | OUTPATIENT
Start: 2023-12-01

## 2024-01-09 ENCOUNTER — TELEPHONE (OUTPATIENT)
Dept: NEUROLOGY | Facility: CLINIC | Age: 74
End: 2024-01-09
Payer: MEDICARE

## 2024-02-05 ENCOUNTER — OFFICE VISIT (OUTPATIENT)
Dept: PRIMARY CARE | Facility: CLINIC | Age: 74
End: 2024-02-05
Payer: MEDICARE

## 2024-02-05 VITALS
BODY MASS INDEX: 32.24 KG/M2 | OXYGEN SATURATION: 97 % | WEIGHT: 225.2 LBS | HEART RATE: 75 BPM | HEIGHT: 70 IN | DIASTOLIC BLOOD PRESSURE: 64 MMHG | SYSTOLIC BLOOD PRESSURE: 116 MMHG

## 2024-02-05 DIAGNOSIS — I25.810 CORONARY ARTERY DISEASE INVOLVING CORONARY BYPASS GRAFT OF NATIVE HEART WITHOUT ANGINA PECTORIS: ICD-10-CM

## 2024-02-05 DIAGNOSIS — I11.0 HYPERTENSIVE HEART DISEASE WITH HEART FAILURE (MULTI): ICD-10-CM

## 2024-02-05 DIAGNOSIS — E55.9 VITAMIN D DEFICIENCY: ICD-10-CM

## 2024-02-05 DIAGNOSIS — E11.9 TYPE 2 DIABETES MELLITUS WITHOUT COMPLICATION, WITH LONG-TERM CURRENT USE OF INSULIN (MULTI): ICD-10-CM

## 2024-02-05 DIAGNOSIS — C64.9 RENAL CELL CARCINOMA, UNSPECIFIED LATERALITY (MULTI): ICD-10-CM

## 2024-02-05 DIAGNOSIS — Z86.010 H/O ADENOMATOUS POLYP OF COLON: ICD-10-CM

## 2024-02-05 DIAGNOSIS — E66.09 CLASS 1 OBESITY DUE TO EXCESS CALORIES WITH SERIOUS COMORBIDITY AND BODY MASS INDEX (BMI) OF 32.0 TO 32.9 IN ADULT: ICD-10-CM

## 2024-02-05 DIAGNOSIS — Z00.00 ENCOUNTER FOR SUBSEQUENT ANNUAL WELLNESS VISIT (AWV) IN MEDICARE PATIENT: Primary | ICD-10-CM

## 2024-02-05 DIAGNOSIS — J30.9 ALLERGIC RHINITIS, UNSPECIFIED SEASONALITY, UNSPECIFIED TRIGGER: ICD-10-CM

## 2024-02-05 DIAGNOSIS — Z79.4 TYPE 2 DIABETES MELLITUS WITHOUT COMPLICATION, WITH LONG-TERM CURRENT USE OF INSULIN (MULTI): ICD-10-CM

## 2024-02-05 PROBLEM — E11.621 DIABETIC FOOT ULCER (MULTI): Status: RESOLVED | Noted: 2023-11-09 | Resolved: 2024-02-05

## 2024-02-05 PROBLEM — L97.522 ULCER OF LEFT FOOT, WITH FAT LAYER EXPOSED (MULTI): Status: RESOLVED | Noted: 2021-07-07 | Resolved: 2024-02-05

## 2024-02-05 PROBLEM — L97.509 DIABETIC FOOT ULCER (MULTI): Status: RESOLVED | Noted: 2023-11-09 | Resolved: 2024-02-05

## 2024-02-05 PROBLEM — L97.512: Status: RESOLVED | Noted: 2021-07-07 | Resolved: 2024-02-05

## 2024-02-05 PROBLEM — M86.679 CHRONIC OSTEOMYELITIS INVOLVING ANKLE AND FOOT (MULTI): Status: RESOLVED | Noted: 2023-11-09 | Resolved: 2024-02-05

## 2024-02-05 PROBLEM — H61.20 IMPACTED CERUMEN: Status: ACTIVE | Noted: 2024-02-05

## 2024-02-05 PROBLEM — M86.60 CHRONIC OSTEOMYELITIS (MULTI): Status: RESOLVED | Noted: 2023-11-03 | Resolved: 2024-02-05

## 2024-02-05 PROBLEM — J01.90 ACUTE SINUSITIS: Status: ACTIVE | Noted: 2024-02-05

## 2024-02-05 PROBLEM — M86.179 ACUTE OSTEOMYELITIS OF ANKLE OR FOOT (MULTI): Status: RESOLVED | Noted: 2023-11-03 | Resolved: 2024-02-05

## 2024-02-05 PROCEDURE — 1170F FXNL STATUS ASSESSED: CPT | Performed by: INTERNAL MEDICINE

## 2024-02-05 PROCEDURE — 4010F ACE/ARB THERAPY RXD/TAKEN: CPT | Performed by: INTERNAL MEDICINE

## 2024-02-05 PROCEDURE — G0439 PPPS, SUBSEQ VISIT: HCPCS | Performed by: INTERNAL MEDICINE

## 2024-02-05 PROCEDURE — 1123F ACP DISCUSS/DSCN MKR DOCD: CPT | Performed by: INTERNAL MEDICINE

## 2024-02-05 PROCEDURE — 3078F DIAST BP <80 MM HG: CPT | Performed by: INTERNAL MEDICINE

## 2024-02-05 PROCEDURE — 1157F ADVNC CARE PLAN IN RCRD: CPT | Performed by: INTERNAL MEDICINE

## 2024-02-05 PROCEDURE — 3074F SYST BP LT 130 MM HG: CPT | Performed by: INTERNAL MEDICINE

## 2024-02-05 PROCEDURE — 1160F RVW MEDS BY RX/DR IN RCRD: CPT | Performed by: INTERNAL MEDICINE

## 2024-02-05 PROCEDURE — 3008F BODY MASS INDEX DOCD: CPT | Performed by: INTERNAL MEDICINE

## 2024-02-05 PROCEDURE — 1158F ADVNC CARE PLAN TLK DOCD: CPT | Performed by: INTERNAL MEDICINE

## 2024-02-05 PROCEDURE — 1159F MED LIST DOCD IN RCRD: CPT | Performed by: INTERNAL MEDICINE

## 2024-02-05 PROCEDURE — 99214 OFFICE O/P EST MOD 30 MIN: CPT | Performed by: INTERNAL MEDICINE

## 2024-02-05 PROCEDURE — 1036F TOBACCO NON-USER: CPT | Performed by: INTERNAL MEDICINE

## 2024-02-05 ASSESSMENT — ACTIVITIES OF DAILY LIVING (ADL)
GROCERY_SHOPPING: INDEPENDENT
BATHING: INDEPENDENT
DRESSING: INDEPENDENT
MANAGING_FINANCES: INDEPENDENT
DOING_HOUSEWORK: INDEPENDENT
TAKING_MEDICATION: INDEPENDENT

## 2024-02-05 ASSESSMENT — PATIENT HEALTH QUESTIONNAIRE - PHQ9
1. LITTLE INTEREST OR PLEASURE IN DOING THINGS: NOT AT ALL
2. FEELING DOWN, DEPRESSED OR HOPELESS: NOT AT ALL
SUM OF ALL RESPONSES TO PHQ9 QUESTIONS 1 AND 2: 0

## 2024-02-05 ASSESSMENT — ENCOUNTER SYMPTOMS
LOSS OF SENSATION IN FEET: 1
OCCASIONAL FEELINGS OF UNSTEADINESS: 0
DEPRESSION: 0

## 2024-02-06 RX ORDER — CETIRIZINE HYDROCHLORIDE 10 MG/1
10 TABLET ORAL NIGHTLY
Qty: 30 TABLET | Refills: 2 | Status: SHIPPED | OUTPATIENT
Start: 2024-02-06 | End: 2024-05-08

## 2024-02-06 NOTE — PROGRESS NOTES
"Subjective   Reason for Visit: Gabo Weiner is an 73 y.o. male here for a Medicare Wellness / follow up visit.     Past Medical, Surgical, and Family History reviewed and updated in chart.          Reviewed all medications by prescribing practitioner or clinical pharmacist (such as prescriptions, OTCs, herbal therapies and supplements) and documented in the medical record     HPI    Medicare wellness       Prostate 11-23  Colonoscopy 2019 recheck 2024  CT chest lung cancer screening n/a  immunizations rev'd  BMI 32.3    Follow up    Left foot rest of toes amputated (no toes now)  Healed well  Podiatry following     CAD / cholesterol stable on rx no side effects  cardio following / Dr Wylie     Hand shaking tremor is worse on rx dose increased no side effects  neuro following / Dr Schroeder     DM stable on rx no side effects    this am  HBA1C 6.9  1-24  neuropathy, microalbuminuria  check blood sugars per endo  endo following  / Dr Moncada  podiatry following / Dr Tavares  Had left leg balloon angioplasty / vasc following ( Dr Neves)  Follow up ophtho     hypertension stable on rx no side effects     vit D stable on rx no side effects     renal cancer and prostate cancer stable  urology released     diet / exercise rev'd    Patient Care Team:  Libby Sullivan MD as PCP - General  Libby Sullivan MD as PCP - [employer] PCP     Review of Systems   All other systems reviewed and are negative.      Objective   Vitals:  /64   Pulse 75   Ht 1.778 m (5' 10\")   Wt 102 kg (225 lb 3.2 oz)   SpO2 97%   BMI 32.31 kg/m²       Physical Exam  Vitals reviewed.   Constitutional:       Appearance: Normal appearance. He is obese.   HENT:      Head: Normocephalic and atraumatic.      Mouth/Throat:      Pharynx: No posterior oropharyngeal erythema.   Eyes:      General: No scleral icterus.     Conjunctiva/sclera: Conjunctivae normal.      Pupils: Pupils are equal, round, and reactive to light. "   Cardiovascular:      Rate and Rhythm: Normal rate and regular rhythm.      Heart sounds: Normal heart sounds.   Pulmonary:      Effort: No respiratory distress.      Breath sounds: No wheezing.   Abdominal:      General: Abdomen is flat. Bowel sounds are normal. There is no distension.      Palpations: Abdomen is soft. There is no mass.      Tenderness: There is no abdominal tenderness. There is no rebound.   Musculoskeletal:         General: Normal range of motion.      Cervical back: Normal range of motion and neck supple.   Skin:     General: Skin is warm and dry.   Neurological:      General: No focal deficit present.      Mental Status: He is alert and oriented to person, place, and time. Mental status is at baseline.   Psychiatric:         Mood and Affect: Mood normal.         Behavior: Behavior normal.         Thought Content: Thought content normal.         Judgment: Judgment normal.         Assessment/Plan   Problem List Items Addressed This Visit       Allergic rhinitis    Relevant Medications    cetirizine (ZyrTEC) 10 mg tablet    Type 2 diabetes mellitus without complications (CMS/HCC)    Obesity    Vitamin D deficiency    CAD (coronary artery disease)    Hypertensive heart disease with heart failure (CMS/HCC)    Renal cell carcinoma (CMS/HCC)     Other Visit Diagnoses       Encounter for subsequent annual wellness visit (AWV) in Medicare patient    -  Primary    H/O adenomatous polyp of colon        Relevant Orders    Referral to General Surgery          Medicare wellness       Prostate 11-23  Colonoscopy 2019 recheck 2024  CT chest lung cancer screening n/a  immunizations rev'd  BMI 32.3    Follow up    Left foot rest of toes amputated (no toes now)  Healed well  Podiatry following     CAD / cholesterol stable on rx no side effects  cardio following / Dr Wylie     Hand shaking tremor is worse on rx dose increased no side effects  neuro following / Dr Schroeder     DM stable on rx no side effects  FBS  116  this am  HBA1C 6.9  1-24  neuropathy, microalbuminuria  check blood sugars per endo  endo following  / Dr Moncada  podiatry following / Dr Tavares  Had left leg balloon angioplasty / vasc following ( Dr Neves)  Follow up ophtho     hypertension stable on rx no side effects     vit D stable on rx no side effects     renal cancer and prostate cancer stable  urology released     diet / exercise rev'd    Colonoscopy ordered    Follow up 3 months

## 2024-02-07 ENCOUNTER — TELEPHONE (OUTPATIENT)
Dept: PRIMARY CARE | Facility: CLINIC | Age: 74
End: 2024-02-07
Payer: MEDICARE

## 2024-02-07 NOTE — TELEPHONE ENCOUNTER
Pt wanted to let you know he is not due for colonoscopy until December of this year.  He will make sure to get scheduled.

## 2024-02-09 DIAGNOSIS — E11.9 TYPE 2 DIABETES MELLITUS WITHOUT COMPLICATIONS (MULTI): ICD-10-CM

## 2024-02-11 RX ORDER — INSULIN ASPART 100 [IU]/ML
INJECTION, SOLUTION INTRAVENOUS; SUBCUTANEOUS
Qty: 30 ML | Refills: 3 | Status: SHIPPED | OUTPATIENT
Start: 2024-02-11

## 2024-02-23 ENCOUNTER — OFFICE VISIT (OUTPATIENT)
Dept: ENDOCRINOLOGY | Facility: CLINIC | Age: 74
End: 2024-02-23
Payer: MEDICARE

## 2024-02-23 VITALS
HEART RATE: 71 BPM | BODY MASS INDEX: 32.43 KG/M2 | SYSTOLIC BLOOD PRESSURE: 163 MMHG | DIASTOLIC BLOOD PRESSURE: 76 MMHG | WEIGHT: 226 LBS

## 2024-02-23 DIAGNOSIS — Z79.4 TYPE 2 DIABETES MELLITUS WITHOUT COMPLICATION, WITH LONG-TERM CURRENT USE OF INSULIN (MULTI): Primary | ICD-10-CM

## 2024-02-23 DIAGNOSIS — E11.9 TYPE 2 DIABETES MELLITUS WITHOUT COMPLICATION, WITH LONG-TERM CURRENT USE OF INSULIN (MULTI): Primary | ICD-10-CM

## 2024-02-23 PROCEDURE — 3077F SYST BP >= 140 MM HG: CPT | Performed by: INTERNAL MEDICINE

## 2024-02-23 PROCEDURE — 4010F ACE/ARB THERAPY RXD/TAKEN: CPT | Performed by: INTERNAL MEDICINE

## 2024-02-23 PROCEDURE — 3008F BODY MASS INDEX DOCD: CPT | Performed by: INTERNAL MEDICINE

## 2024-02-23 PROCEDURE — 1160F RVW MEDS BY RX/DR IN RCRD: CPT | Performed by: INTERNAL MEDICINE

## 2024-02-23 PROCEDURE — 1159F MED LIST DOCD IN RCRD: CPT | Performed by: INTERNAL MEDICINE

## 2024-02-23 PROCEDURE — 3078F DIAST BP <80 MM HG: CPT | Performed by: INTERNAL MEDICINE

## 2024-02-23 PROCEDURE — 1036F TOBACCO NON-USER: CPT | Performed by: INTERNAL MEDICINE

## 2024-02-23 PROCEDURE — 1157F ADVNC CARE PLAN IN RCRD: CPT | Performed by: INTERNAL MEDICINE

## 2024-02-23 PROCEDURE — 99214 OFFICE O/P EST MOD 30 MIN: CPT | Performed by: INTERNAL MEDICINE

## 2024-02-23 RX ORDER — INSULIN DEGLUDEC 100 U/ML
32 INJECTION, SOLUTION SUBCUTANEOUS NIGHTLY
Qty: 3 ML | Refills: 12
Start: 2024-02-23 | End: 2025-02-22

## 2024-02-23 ASSESSMENT — ENCOUNTER SYMPTOMS
DIARRHEA: 0
FREQUENCY: 0
ARTHRALGIAS: 0
NAUSEA: 0
VOMITING: 0
BACK PAIN: 0
CONSTIPATION: 0
COUGH: 0
SHORTNESS OF BREATH: 0
UNEXPECTED WEIGHT CHANGE: 0
ENDOCRINE COMMENTS: AS ABOVE
TREMORS: 1
DIFFICULTY URINATING: 0

## 2024-02-23 NOTE — PROGRESS NOTES
History Of Present Illness  Gabo Weiner is a 73 y.o. male     Duration of type 2 diabetes mellitus:  19 years  Complicated by neuropathy, albuminuria, CAD, amputations     A1c 6.9% (1/9/24) per [place] [facility], [facility]    Novolog   Breakfast 6 units  Lunch 8 units  Dinner 8 units  plus corrective scale, add 1 for every 50 over glucose 101 mg/dl.     Levemir 32 units at bedtime. Patient assistance, NovoNordisk  Levemir going out of production    Metformin 1000 mg BID  Jardiance 25 mg/day, prescription per Dr. Wylie.     [employer]pay[employer] Chelsie  Patient is testing glucose 288 times daily  Records reviewed, on file    Last eye exam:  December 2023    Past Medical History  He has a past medical history of Hyperlipidemia, unspecified (11/10/2022), Malignant neoplasm of prostate (CMS/HCC) (09/29/2021), Personal history of malignant neoplasm, unspecified (07/09/2014), Personal history of other diseases of the nervous system and sense organs (04/01/2020), Personal history of other diseases of the respiratory system (01/07/2016), Personal history of other malignant neoplasm of kidney (11/10/2022), and Unspecified hearing loss, unspecified ear (07/09/2014).    Surgical History  He has a past surgical history that includes Other surgical history (06/10/2013); Tonsillectomy (06/10/2013); Other surgical history (11/10/2022); Colonoscopy (12/31/2019); Colonoscopy (12/31/2019); Colonoscopy (12/31/2019); Colonoscopy (12/31/2019); Other surgical history (04/17/2014); Other surgical history (04/17/2014); and Other surgical history (04/17/2014).     Social History  He reports that he has never smoked. He has never been exposed to tobacco smoke. He has never used smokeless tobacco. He reports that he does not drink alcohol and does not use drugs.    Family History  No family history on file.    Medications  Current Outpatient Medications   Medication Instructions    aspirin 81 mg EC tablet oral, Every 24 hours     "atorvastatin (Lipitor) 80 mg tablet oral    BD Juhi 2nd Gen Pen Needle 32 gauge x 5/32\" needle 1 each 5 times a day.    cetirizine (ZYRTEC) 10 mg, oral, Nightly    cholecalciferol (Vitamin D-3) 5,000 Units tablet oral    clopidogrel (Plavix) 75 mg tablet 1 tablet, oral, Daily    docusate sodium (Colace) 100 mg capsule oral    empagliflozin (Jardiance) 25 mg 1 tablet, oral, Daily    fluoride, sodium, (Prevident 5000 Booster) 1.1 % dental paste USE AS DIRECTED    FreeStyle Chelsie 14 Day Sensor kit FOR CONTINUOUS GLUCOSE MONITORING    furosemide (Lasix) 40 mg tablet 1 tablet, oral, Daily    insulin aspart (NovoLOG Flexpen U-100 Insulin) 100 unit/mL (3 mL) pen INJECT 6 TO 10 UNITS THREE TIMES DAILY BEFORE MEALS    insulin detemir (Levemir Flextouch) 100 unit/mL (3 mL) pen subcutaneous    lisinopril 20 mg tablet oral    metFORMIN (GLUCOPHAGE) 1,000 mg, oral, 2 times daily    metoprolol tartrate (LOPRESSOR) 100 mg, oral, 2 times daily    metoprolol tartrate (LOPRESSOR) 25 mg, oral, 2 times daily    multivitamin tablet oral    primidone (MYSOLINE) 200 mg, oral, 4 times daily       Allergies  Patient has no known allergies.    Review of Systems   Constitutional:  Negative for unexpected weight change.   Eyes:  Positive for visual disturbance.   Respiratory:  Negative for cough and shortness of breath.    Cardiovascular:  Negative for chest pain.   Gastrointestinal:  Negative for constipation, diarrhea, nausea and vomiting.   Endocrine:        As above   Genitourinary:  Negative for difficulty urinating and frequency.   Musculoskeletal:  Negative for arthralgias and back pain.   Skin:  Negative for rash.   Neurological:  Positive for tremors.         Last Recorded Vitals  Blood pressure 163/76, pulse 71, weight 103 kg (226 lb).    Physical Exam  Constitutional:       General: He is not in acute distress.  HENT:      Head: Normocephalic.      Comments: Mild facial asymmetry     Mouth/Throat:      Mouth: Mucous membranes are " moist.   Eyes:      Extraocular Movements: Extraocular movements intact.   Neck:      Thyroid: No thyromegaly.   Cardiovascular:      Pulses:           Radial pulses are 2+ on the right side and 2+ on the left side.        Dorsalis pedis pulses are 2+ on the right side.   Musculoskeletal:      Right lower leg: No edema.      Left lower leg: No edema.      Left foot: Deformity (TMA) present.   Feet:      Comments: Dusky feet (chronic)  No foot sores  Skin:     Comments: No foot sores   Neurological:      Mental Status: He is alert.      Motor: Tremor (head and hands) present.   Psychiatric:         Mood and Affect: Affect normal.          Relevant Results  Glucose   Date Value   07/31/2023 160 mg/dL (H)   07/12/2023 110 MG/DL (H)   07/10/2023 144 MG/DL (H)     Hemoglobin A1C (%)   Date Value   07/31/2023 7.3 (A)   07/10/2023 7.4 (H)   01/25/2023 7.4 (H)     Bicarbonate   Date Value   07/31/2023 31 mmol/L   07/12/2023 25 MMOL/L   07/10/2023 25 MMOL/L     Urea Nitrogen   Date Value   07/31/2023 18 mg/dL   07/12/2023 14 MG/DL   07/10/2023 17 MG/DL     Creatinine   Date Value   07/31/2023 0.76 mg/dL   07/12/2023 0.8 MG/DL   07/10/2023 0.9 MG/DL     Lab Results   Component Value Date    CHOL 138 07/31/2023    CHOL 122 08/08/2022    CHOL 140 08/03/2021     Lab Results   Component Value Date    HDL 47.2 07/31/2023    HDL 36.0 (A) 08/08/2022    HDL 38.0 (A) 08/03/2021     Lab Results   Component Value Date    TRIG 147 07/31/2023    TRIG 191 (H) 08/08/2022    TRIG 289 (H) 08/03/2021     Lab Results   Component Value Date    TSH 1.42 07/31/2023      Latest Reference Range & Units 07/31/23 09:05   Creatinine, Urine Random 20.0 - 370.0 mg/dL 59.7   Albumin/Creatine Ratio 0.0 - 30.0 ug/mg crt 36.2 (H)         IMPRESSION  TYPE 2 DIABETES MELLITUS  LONG TERM CURRENT INSULIN USE  Glucose well controlled  Levemir is going out of production      RECOMMENDATIONS  Change from Levemir to Tresiba 32 units at bedtime if it is covered by  [employer] Patient Assistance    If not available, will pursue Lantus or Basaglar at your pharmacy    Follow up 6 months

## 2024-02-23 NOTE — LETTER
February 23, 2024     Libby Sullivan MD  [facility], [address] [address]    Patient: Gabo Weiner   YOB: 1950   Date of Visit: 2/23/2024       Dear Dr. Libby Sullivan MD:    Thank you for referring Gabo Weiner to me for evaluation. Below are my notes for this consultation.  If you have questions, please do not hesitate to call me. I look forward to following your patient along with you.       Sincerely,     Rogelio Moncada MD      CC: No Recipients  ______________________________________________________________________________________    History Of Present Illness  Gabo Weiner is a 73 y.o. male     Duration of type 2 diabetes mellitus:  19 years  Complicated by neuropathy, albuminuria, CAD, amputations     A1c 6.9% (1/9/24) per [place] [facility], [facility]    Novolog   Breakfast 6 units  Lunch 8 units  Dinner 8 units  plus corrective scale, add 1 for every 50 over glucose 101 mg/dl.     Levemir 32 units at bedtime. Patient assistance, NovoNordisk  Levemir going out of production    Metformin 1000 mg BID  Jardiance 25 mg/day, prescription per Dr. Wylie.     [facility] Chelsie  Patient is testing glucose 288 times daily  Records reviewed, on file    Last eye exam:  December 2023    Past Medical History  He has a past medical history of Hyperlipidemia, unspecified (11/10/2022), Malignant neoplasm of prostate (CMS/HCC) (09/29/2021), Personal history of malignant neoplasm, unspecified (07/09/2014), Personal history of other diseases of the nervous system and sense organs (04/01/2020), Personal history of other diseases of the respiratory system (01/07/2016), Personal history of other malignant neoplasm of kidney (11/10/2022), and Unspecified hearing loss, unspecified ear (07/09/2014).    Surgical History  He has a past surgical history that includes Other surgical history (06/10/2013); Tonsillectomy (06/10/2013); Other surgical history  "(11/10/2022); Colonoscopy (12/31/2019); Colonoscopy (12/31/2019); Colonoscopy (12/31/2019); Colonoscopy (12/31/2019); Other surgical history (04/17/2014); Other surgical history (04/17/2014); and Other surgical history (04/17/2014).     Social History  He reports that he has never smoked. He has never been exposed to tobacco smoke. He has never used smokeless tobacco. He reports that he does not drink alcohol and does not use drugs.    Family History  No family history on file.    Medications  Current Outpatient Medications   Medication Instructions   • aspirin 81 mg EC tablet oral, Every 24 hours   • atorvastatin (Lipitor) 80 mg tablet oral   • BD Juhi 2nd Gen Pen Needle 32 gauge x 5/32\" needle 1 each 5 times a day.   • cetirizine (ZYRTEC) 10 mg, oral, Nightly   • cholecalciferol (Vitamin D-3) 5,000 Units tablet oral   • clopidogrel (Plavix) 75 mg tablet 1 tablet, oral, Daily   • docusate sodium (Colace) 100 mg capsule oral   • empagliflozin (Jardiance) 25 mg 1 tablet, oral, Daily   • fluoride, sodium, (Prevident 5000 Booster) 1.1 % dental paste USE AS DIRECTED   • FreeStyle Chelsie 14 Day Sensor kit FOR CONTINUOUS GLUCOSE MONITORING   • furosemide (Lasix) 40 mg tablet 1 tablet, oral, Daily   • insulin aspart (NovoLOG Flexpen U-100 Insulin) 100 unit/mL (3 mL) pen INJECT 6 TO 10 UNITS THREE TIMES DAILY BEFORE MEALS   • insulin detemir (Levemir Flextouch) 100 unit/mL (3 mL) pen subcutaneous   • lisinopril 20 mg tablet oral   • metFORMIN (GLUCOPHAGE) 1,000 mg, oral, 2 times daily   • metoprolol tartrate (LOPRESSOR) 100 mg, oral, 2 times daily   • metoprolol tartrate (LOPRESSOR) 25 mg, oral, 2 times daily   • multivitamin tablet oral   • primidone (MYSOLINE) 200 mg, oral, 4 times daily       Allergies  Patient has no known allergies.    Review of Systems   Constitutional:  Negative for unexpected weight change.   Eyes:  Positive for visual disturbance.   Respiratory:  Negative for cough and shortness of breath.  "   Cardiovascular:  Negative for chest pain.   Gastrointestinal:  Negative for constipation, diarrhea, nausea and vomiting.   Endocrine:        As above   Genitourinary:  Negative for difficulty urinating and frequency.   Musculoskeletal:  Negative for arthralgias and back pain.   Skin:  Negative for rash.   Neurological:  Positive for tremors.         Last Recorded Vitals  Blood pressure 163/76, pulse 71, weight 103 kg (226 lb).    Physical Exam  Constitutional:       General: He is not in acute distress.  HENT:      Head: Normocephalic.      Comments: Mild facial asymmetry     Mouth/Throat:      Mouth: Mucous membranes are moist.   Eyes:      Extraocular Movements: Extraocular movements intact.   Neck:      Thyroid: No thyromegaly.   Cardiovascular:      Pulses:           Radial pulses are 2+ on the right side and 2+ on the left side.        Dorsalis pedis pulses are 2+ on the right side.   Musculoskeletal:      Right lower leg: No edema.      Left lower leg: No edema.      Left foot: Deformity (TMA) present.   Feet:      Comments: Dusky feet (chronic)  No foot sores  Skin:     Comments: No foot sores   Neurological:      Mental Status: He is alert.      Motor: Tremor (head and hands) present.   Psychiatric:         Mood and Affect: Affect normal.          Relevant Results  Glucose   Date Value   07/31/2023 160 mg/dL (H)   07/12/2023 110 MG/DL (H)   07/10/2023 144 MG/DL (H)     Hemoglobin A1C (%)   Date Value   07/31/2023 7.3 (A)   07/10/2023 7.4 (H)   01/25/2023 7.4 (H)     Bicarbonate   Date Value   07/31/2023 31 mmol/L   07/12/2023 25 MMOL/L   07/10/2023 25 MMOL/L     Urea Nitrogen   Date Value   07/31/2023 18 mg/dL   07/12/2023 14 MG/DL   07/10/2023 17 MG/DL     Creatinine   Date Value   07/31/2023 0.76 mg/dL   07/12/2023 0.8 MG/DL   07/10/2023 0.9 MG/DL     Lab Results   Component Value Date    CHOL 138 07/31/2023    CHOL 122 08/08/2022    CHOL 140 08/03/2021     Lab Results   Component Value Date    HDL 47.2  07/31/2023    HDL 36.0 (A) 08/08/2022    HDL 38.0 (A) 08/03/2021     Lab Results   Component Value Date    TRIG 147 07/31/2023    TRIG 191 (H) 08/08/2022    TRIG 289 (H) 08/03/2021     Lab Results   Component Value Date    TSH 1.42 07/31/2023      Latest Reference Range & Units 07/31/23 09:05   Creatinine, Urine Random 20.0 - 370.0 mg/dL 59.7   Albumin/Creatine Ratio 0.0 - 30.0 ug/mg crt 36.2 (H)         IMPRESSION  TYPE 2 DIABETES MELLITUS  LONG TERM CURRENT INSULIN USE  Glucose well controlled  Levemir is going out of production      RECOMMENDATIONS  Change from Levemir to Tresiba 32 units at bedtime if it is covered by [employer] Patient Assistance    If not available, will pursue Lantus or Basaglar at your pharmacy    Follow up 6 months

## 2024-02-23 NOTE — PATIENT INSTRUCTIONS
RECOMMENDATIONS  Change from Levemir to Tresiba 32 units at bedtime if it is covered by [employer] Patient Assistance    If not available, will pursue Lantus or Basaglar at your pharmacy    Follow up 6 months

## 2024-04-12 DIAGNOSIS — R25.1 TREMOR: ICD-10-CM

## 2024-04-12 RX ORDER — PRIMIDONE 50 MG/1
250 TABLET ORAL NIGHTLY
Qty: 450 TABLET | Refills: 3 | Status: SHIPPED | OUTPATIENT
Start: 2024-04-12 | End: 2024-04-22 | Stop reason: SDUPTHER

## 2024-04-15 ENCOUNTER — TELEPHONE (OUTPATIENT)
Dept: NEUROLOGY | Facility: CLINIC | Age: 74
End: 2024-04-15
Payer: MEDICARE

## 2024-04-22 ENCOUNTER — OFFICE VISIT (OUTPATIENT)
Dept: NEUROLOGY | Facility: CLINIC | Age: 74
End: 2024-04-22
Payer: MEDICARE

## 2024-04-22 DIAGNOSIS — G25.0 ESSENTIAL TREMOR: Primary | ICD-10-CM

## 2024-04-22 DIAGNOSIS — R25.1 TREMOR: ICD-10-CM

## 2024-04-22 PROCEDURE — 99214 OFFICE O/P EST MOD 30 MIN: CPT | Performed by: PSYCHIATRY & NEUROLOGY

## 2024-04-22 PROCEDURE — 3008F BODY MASS INDEX DOCD: CPT | Performed by: PSYCHIATRY & NEUROLOGY

## 2024-04-22 PROCEDURE — 1160F RVW MEDS BY RX/DR IN RCRD: CPT | Performed by: PSYCHIATRY & NEUROLOGY

## 2024-04-22 PROCEDURE — 4010F ACE/ARB THERAPY RXD/TAKEN: CPT | Performed by: PSYCHIATRY & NEUROLOGY

## 2024-04-22 PROCEDURE — 1159F MED LIST DOCD IN RCRD: CPT | Performed by: PSYCHIATRY & NEUROLOGY

## 2024-04-22 PROCEDURE — 1157F ADVNC CARE PLAN IN RCRD: CPT | Performed by: PSYCHIATRY & NEUROLOGY

## 2024-04-22 PROCEDURE — 1036F TOBACCO NON-USER: CPT | Performed by: PSYCHIATRY & NEUROLOGY

## 2024-04-22 RX ORDER — PRIMIDONE 50 MG/1
150 TABLET ORAL 2 TIMES DAILY
Qty: 540 TABLET | Refills: 3 | Status: SHIPPED | OUTPATIENT
Start: 2024-04-22

## 2024-04-22 NOTE — PROGRESS NOTES
Chief complaint:    74 year old man with essential tremor.  PCP Dr. CHADD Sullivan.    HPI:    Ongoing tremor.  Gradually getting more prominent over the years.  No side effects or sedation at current dose of primidone.  Would like to go up if possible.     Current medications include:  Primidone 50 mg takes 5 at HS    I reviewed the relevant portions of the patient's chart since the last visit with me on  4/24/23.    Neurologic Exam     Mental Status   Oriented to person, place, and time.   Level of consciousness: alert    Cranial Nerves   Cranial nerves II through XII intact.     Motor Exam   Muscle bulk: normal  Overall muscle tone: normal    Strength   Strength 5/5 except as noted.     Sensory Exam   Light touch normal.     Gait, Coordination, and Reflexes     Gait  Gait: normal    Coordination   Romberg: negative    Tremor   Intention tremor: present  Action tremor: left arm and right arm    Reflexes   Reflexes 2+ except as noted.   Right plantar: normal  Left plantar: normal  Mild/moderate essential tremor in hands, worse with action.  Head too.      Assessment and Plan:  Essential tremor.  Discussed.  Can go up to total of 6 pills per day, would split into two doses, take 3 in AM and 3 at HS.  Watch for sedation, call if problems.  We did touch on the subject of deep brain stimulation, don't think he's ready for that quite yet.  The patient was advised I will be retiring in September of 2024.   Suggested patient establish care with another  neurologist.  There are providers that see patients in [place], [place], and other area locations.  Contact information to schedule was provided to the patient.   EL31csb/TC>50%      Sean Schroeder MD

## 2024-04-22 NOTE — PATIENT INSTRUCTIONS
Gabo I am retiring in September.  Please establish care with another  neurologist (call [phone] to schedule an appointment).  Local neurologists I recommend are Dr. Javon Joy, Dr. Albaro Clemente, and Dr. Basim Munguia.  Their offices are in [place] or [place] and you can schedule an appointment with them right now at your convenience.  Also, I can recommend Dr. Wilton Bhardwaj and Dr. Cordell Lynn, who will be opening an office in [place] in the coming months - scheduling for them will be available soon by calling the same  scheduling number [phone].   And there are  neurologists in other locations too.  All of your medical records will be in the computer and available to any  physician you choose.  Thanks and best wishes to you  --Dr. Schroeder

## 2024-05-08 ENCOUNTER — OFFICE VISIT (OUTPATIENT)
Dept: PRIMARY CARE | Facility: CLINIC | Age: 74
End: 2024-05-08
Payer: MEDICARE

## 2024-05-08 VITALS
DIASTOLIC BLOOD PRESSURE: 78 MMHG | BODY MASS INDEX: 32.57 KG/M2 | WEIGHT: 227 LBS | OXYGEN SATURATION: 98 % | SYSTOLIC BLOOD PRESSURE: 120 MMHG | HEART RATE: 70 BPM | TEMPERATURE: 97.9 F

## 2024-05-08 DIAGNOSIS — E11.69 TYPE 2 DIABETES MELLITUS WITH OTHER SPECIFIED COMPLICATION, WITH LONG-TERM CURRENT USE OF INSULIN (MULTI): ICD-10-CM

## 2024-05-08 DIAGNOSIS — E66.09 CLASS 1 OBESITY DUE TO EXCESS CALORIES WITH SERIOUS COMORBIDITY AND BODY MASS INDEX (BMI) OF 32.0 TO 32.9 IN ADULT: ICD-10-CM

## 2024-05-08 DIAGNOSIS — C64.9 RENAL CELL CARCINOMA, UNSPECIFIED LATERALITY (MULTI): ICD-10-CM

## 2024-05-08 DIAGNOSIS — I73.9 PVD (PERIPHERAL VASCULAR DISEASE) (CMS-HCC): ICD-10-CM

## 2024-05-08 DIAGNOSIS — I11.0 HYPERTENSIVE HEART DISEASE WITH HEART FAILURE (MULTI): ICD-10-CM

## 2024-05-08 DIAGNOSIS — Z95.1 S/P CABG X 5: ICD-10-CM

## 2024-05-08 DIAGNOSIS — E55.9 VITAMIN D DEFICIENCY: ICD-10-CM

## 2024-05-08 DIAGNOSIS — Z00.00 ANNUAL PHYSICAL EXAM: Primary | ICD-10-CM

## 2024-05-08 DIAGNOSIS — Z79.4 TYPE 2 DIABETES MELLITUS WITH OTHER SPECIFIED COMPLICATION, WITH LONG-TERM CURRENT USE OF INSULIN (MULTI): ICD-10-CM

## 2024-05-08 DIAGNOSIS — E78.00 HYPERCHOLESTEREMIA: ICD-10-CM

## 2024-05-08 PROCEDURE — 3074F SYST BP LT 130 MM HG: CPT | Performed by: INTERNAL MEDICINE

## 2024-05-08 PROCEDURE — 1159F MED LIST DOCD IN RCRD: CPT | Performed by: INTERNAL MEDICINE

## 2024-05-08 PROCEDURE — 99214 OFFICE O/P EST MOD 30 MIN: CPT | Performed by: INTERNAL MEDICINE

## 2024-05-08 PROCEDURE — 1160F RVW MEDS BY RX/DR IN RCRD: CPT | Performed by: INTERNAL MEDICINE

## 2024-05-08 PROCEDURE — 4010F ACE/ARB THERAPY RXD/TAKEN: CPT | Performed by: INTERNAL MEDICINE

## 2024-05-08 PROCEDURE — 3078F DIAST BP <80 MM HG: CPT | Performed by: INTERNAL MEDICINE

## 2024-05-08 PROCEDURE — 99397 PER PM REEVAL EST PAT 65+ YR: CPT | Performed by: INTERNAL MEDICINE

## 2024-05-08 PROCEDURE — 1036F TOBACCO NON-USER: CPT | Performed by: INTERNAL MEDICINE

## 2024-05-08 PROCEDURE — 3008F BODY MASS INDEX DOCD: CPT | Performed by: INTERNAL MEDICINE

## 2024-05-08 PROCEDURE — 1157F ADVNC CARE PLAN IN RCRD: CPT | Performed by: INTERNAL MEDICINE

## 2024-05-08 NOTE — PROGRESS NOTES
Subjective   Patient ID: Gabo Weiner is a 74 y.o. male who presents for  yearly physical / Follow-up (3 month ).  HPI            Yearly physical        Prostate 11-23  Colonoscopy 2019 recheck 12/24  CT chest lung cancer screening n/a  immunizations rev'd UTD  BMI 32.5        Follow up        Feels well    Left foot rest of toes amputated (no toes now)  Healed well  Podiatry following     CAD / cholesterol stable on rx no side effects  cardio following / Dr Wylie     Hand shaking tremor is worse on rx dose increased no side effects  neuro following     DM stable on rx no side effects   after breakfast this am  HBA1C 6.9  1-24  neuropathy, microalbuminuria  check blood sugars per endo  endo following  / Dr Moncada  podiatry following / Dr Tavares    PVD  asymptomatic  Had left leg balloon angioplasty / vasc following ( Dr Neves)  Follow up ophtho     hypertension stable on rx no side effects     vit D stable on rx no side effects     renal cancer and prostate cancer stable  urology released     diet / exercise rev'd     Colonoscopy ordered due 12-24    Review of Systems   All other systems reviewed and are negative.      Objective   /78   Pulse 70   Temp 36.6 °C (97.9 °F)   Wt 103 kg (227 lb)   SpO2 98%   BMI 32.57 kg/m²   Lab Results   Component Value Date    WBC 8.2 07/31/2023    HGB 13.5 07/31/2023    HCT 44.9 07/31/2023     07/31/2023    CHOL 138 07/31/2023    TRIG 147 07/31/2023    HDL 47.2 07/31/2023    ALT 25 07/31/2023    AST 22 07/31/2023     07/31/2023    K 4.2 07/31/2023     07/31/2023    CREATININE 0.76 07/31/2023    BUN 18 07/31/2023    CO2 31 07/31/2023    TSH 1.42 07/31/2023    PSA 0.16 11/09/2023    INR 1.0 07/10/2023    HGBA1C 7.3 (A) 07/31/2023    ALBUR 139.3 12/15/2017           Physical Exam  Vitals reviewed.   Constitutional:       Appearance: Normal appearance. He is obese.   HENT:      Head: Normocephalic and atraumatic.      Mouth/Throat:      Pharynx: No  posterior oropharyngeal erythema.   Eyes:      General: No scleral icterus.     Conjunctiva/sclera: Conjunctivae normal.      Pupils: Pupils are equal, round, and reactive to light.   Cardiovascular:      Rate and Rhythm: Normal rate and regular rhythm.      Heart sounds: Normal heart sounds.   Pulmonary:      Effort: No respiratory distress.      Breath sounds: No wheezing.   Abdominal:      General: Abdomen is flat. Bowel sounds are normal. There is no distension.      Palpations: Abdomen is soft. There is no mass.      Tenderness: There is no abdominal tenderness. There is no rebound.   Musculoskeletal:         General: Normal range of motion.      Cervical back: Normal range of motion and neck supple.   Skin:     General: Skin is warm and dry.   Neurological:      General: No focal deficit present.      Mental Status: He is alert and oriented to person, place, and time. Mental status is at baseline.   Psychiatric:         Mood and Affect: Mood normal.         Behavior: Behavior normal.         Thought Content: Thought content normal.         Judgment: Judgment normal.         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    Vitamin D deficiency E55.9    Relevant Orders    Vitamin D 25-Hydroxy,Total (for eval of Vitamin D levels)    Hypertensive heart disease with heart failure (Multi) I11.0    Renal cell carcinoma (Multi) C64.9    Relevant Orders    CBC and Auto Differential     Other Visit Diagnoses         Codes    Annual physical exam    -  Primary Z00.00    Relevant Orders    Comprehensive Metabolic Panel    Lipid Panel    TSH with reflex to Free T4 if abnormal    PVD (peripheral vascular disease) (CMS-HCC)     I73.9    Hypercholesteremia     E78.00          Assessment/Plan            Yearly physical        Prostate 11-23  Colonoscopy 2019 recheck 12/24  CT  chest lung cancer screening n/a  immunizations rev'd UTD  BMI 32.5        Follow up        Feels well    Left foot rest of toes amputated (no toes now)  Healed well  Podiatry following     CAD / cholesterol stable on rx no side effects  cardio following / Dr Wylie     Hand shaking tremor is worse on rx dose increased no side effects  neuro following     DM stable on rx no side effects   after breakfast this am  HBA1C 6.9  1-24  neuropathy, microalbuminuria  check blood sugars per endo  endo following  / Dr Moncada  podiatry following / Dr Tavares        PVD  asymptomatic  Had left leg balloon angioplasty / vasc following ( Dr Neves)  Follow up ophtho     hypertension stable on rx no side effects     vit D stable on rx no side effects     renal cancer and prostate cancer stable  urology released     diet / exercise rev'd     Colonoscopy ordered due 12-24        Check labs before appt        Follow up 3 months

## 2024-07-03 ENCOUNTER — TELEPHONE (OUTPATIENT)
Dept: NEUROLOGY | Facility: CLINIC | Age: 74
End: 2024-07-03
Payer: MEDICARE

## 2024-07-03 DIAGNOSIS — R25.1 TREMOR: ICD-10-CM

## 2024-07-03 RX ORDER — PRIMIDONE 50 MG/1
150 TABLET ORAL 2 TIMES DAILY
Qty: 540 TABLET | Refills: 3 | Status: SHIPPED | OUTPATIENT
Start: 2024-07-03

## 2024-07-10 ENCOUNTER — APPOINTMENT (OUTPATIENT)
Dept: OTOLARYNGOLOGY | Facility: CLINIC | Age: 74
End: 2024-07-10
Payer: MEDICARE

## 2024-07-10 DIAGNOSIS — H61.23 BILATERAL IMPACTED CERUMEN: Primary | ICD-10-CM

## 2024-07-10 PROCEDURE — 3008F BODY MASS INDEX DOCD: CPT | Performed by: OTOLARYNGOLOGY

## 2024-07-10 PROCEDURE — 1157F ADVNC CARE PLAN IN RCRD: CPT | Performed by: OTOLARYNGOLOGY

## 2024-07-10 PROCEDURE — 1036F TOBACCO NON-USER: CPT | Performed by: OTOLARYNGOLOGY

## 2024-07-10 PROCEDURE — 4010F ACE/ARB THERAPY RXD/TAKEN: CPT | Performed by: OTOLARYNGOLOGY

## 2024-07-10 PROCEDURE — 1159F MED LIST DOCD IN RCRD: CPT | Performed by: OTOLARYNGOLOGY

## 2024-07-10 PROCEDURE — 69210 REMOVE IMPACTED EAR WAX UNI: CPT | Performed by: OTOLARYNGOLOGY

## 2024-07-10 PROCEDURE — 99212 OFFICE O/P EST SF 10 MIN: CPT | Performed by: OTOLARYNGOLOGY

## 2024-07-10 NOTE — PROGRESS NOTES
Chief Complaint     ear cleaning       History of Present Illness     07.10.2024: Wax was cleaned bilaterally.  Tympanic membranes look intact bilaterally.    Plan  1-follow-up in one year  ______________________________________________________________________    06.07.2023: He comes to get his ears checked and cleaned. Wax was cleaned bilaterally. TMs look normal.  He has bilateral decreased hearing.     Recommendations:  1- follow up once a year     _______________________________________________________________________________________________      06.03.2022: He comes to get his ears checked and cleaned. Wax was cleaned bilaterally. TMs look normal.  He has bilateral decreased hearing.     Recommendations:  1- follow up once a year     _______________________________________________________________________________________________      Mr. Weiner is a 69 yo M. He comes to get his ears checked and cleaned.  He has hearing deficit for years. He is interested in using hearing aids.  History of working in a noisy environment (+).      06.04.2021: He comes for follow up. No ear issues.      On examination, there was some ear wax bilaterally, more on the left side. Cleaning was done. Tympanic membranes look normal.  Nasal septum deviated to right superiorly and to left inferiorly. Septum touches left inferior turbinate.  Tonsillectomy (+)     Plan:  1- follow up in 1 year      Review of Systems     Constitutional: no fever.   ENMT: difficulty with hearing~and~hearing loss.      Physical Exam  (old exam note)  General appearance: Healthy-appearing, well-nourished, well groomed, in no acute distress.      Head and Face Head and face: Atraumatic with no masses, lesions, or scarring.      Salivary glands: No tenderness of the parotid glands or parotid masses. (old exam)     No tenderness of the submandibular glands or submandibular masses. (old exam)     Facial strength: Normal strength and symmetry, no synkinesis or  facial tic.      Eyes: Conjunctivas look non-hyperemic bilaterally     Ears: Bilaterally wax was cleaned, ear canals look normal. Tympanic membranes intact, no hyperemia, fluid or retraction. Hearing grossly normal.      Nose: Mucosa looks normal. No purulent discharge. Septum deviated to left inferiorly and to right superiorly. (old exam)     Oral Cavity/Mouth: Lips and tongue look normal. (old exam)     Throat: No postnasal discharge. No tonsil hypertrophy. No hyperemia.(old exam)     Neck: Symmetrical, trachea midline. (old exam)     Pulmonary: Normal respiratory effort.      Lymphatic No palpable pathologic lymph nodes at neck. (old exam)     Neurological/Psychiatric Orientation to person, place, and time: Normal.   Mood and affect: Normal.      Extremities: no clubbing        Procedure  EAR WAX REMOVAL 07.10.2024  Patient had bilateral ear wax. Using otoscope and suction cleaning was done. Patient tolerated the procedure well.           Diagnoses/Problems     · Bilateral impacted cerumen (380.4) (H61.23)      Patient Discussion/Summary     07.10.2024: Wax was cleaned bilaterally.  Tympanic membranes look intact bilaterally.    Plan  1-follow-up in one year  ______________________________________________________________________    06.07.2023: He comes to get his ears checked and cleaned. Wax was cleaned bilaterally. TMs look normal.  He has bilateral decreased hearing.     Recommendations:  1- follow up once a year     _______________________________________________________________________________________________     06.03.2022: He comes to get his ears checked and cleaned. Wax was cleaned bilaterally. TMs look normal.  He has bilateral decreased hearing.     Recommendations:  1- follow up once a year     _______________________________________________________________________________________________        PATIENT EDUCATION - EAR WAX     What is ear wax impaction?  Ear wax impaction is when ear wax builds up  "enough to cause symptoms. Normally, ear wax helps to protect the insides of the ears and prevents injury or infection. But having too much ear wax can cause symptoms such as pain and trouble hearing. The medical term for ear wax is \"cerumen.\"     Young children and older adults are more likely than others to have ear wax impaction.     What causes ear wax impaction?  Several different things can cause ear wax impaction:     Diseases that affect the ear - Some health problems can affect the shape of the inside of the ear, and make it hard for wax to move out. For example, skin problems that cause skin cells to shed a lot can lead to wax build-up in the ears.     A narrow ear canal - In some people, the ear canals are narrower than in others. These people might be more likely to have ear wax impaction. A person's ear canal can become narrower after an ear injury or after severe or multiple ear infections.     Changes in ear wax and lining due to aging - As people get older, their ear wax gets harder and thicker. This makes it difficult for the wax to move out of the ear as it should.     Bad ear-cleaning habits - Some people try to clean their ears using cotton swabs (Q-Tips) or other tools. This can actually push the wax deeper into the ear instead of getting it out. Over time, this can cause ear wax impaction.     Making too much ear wax - Some people make more ear wax than others. This can happen when water gets trapped in the ear, or when the ear is injured. But some people have a lot of ear wax for no obvious reason.     What are the symptoms of ear wax impaction?  The symptoms include:     Trouble hearing     Pain in the ear     Hearing a ringing noise in the ear     Feeling like the ear is blocked or plugged     These symptoms can happen in one or both ears.     Should I see a doctor or nurse?  Yes. If you or your child has any of these symptoms, see a doctor or nurse. They can check the insides of the ears to " "figure out if the symptoms are caused by ear wax impaction or another problem, such as an ear infection.     Should I clean my (or my child's) ears at home?  No. The insides of the ears do not usually need to be cleaned. Sticking anything into the ears can push the wax in deeper and cause impaction.     \"Ear candling\" involves lighting one end of a hollow candle, and putting the other end in the ear. Ear candling is not recommended for ear wax removal. It does not remove ear wax and can even cause ear injuries and burns.     How is ear wax impaction treated?  There are several treatments to remove impacted ear wax. Doctors and nurses offer these treatments only to people who have bothersome symptoms. They do not recommend treatments for removing ear wax in people who have no symptoms, even if their ears are impacted.     In some cases, doctors and nurses will remove ear wax in people whose ears are impacted and who aren't able to let others know if they have symptoms or not. This can include young children, and people who are confused or have trouble speaking, including some older adults.     There are several different ways to remove ear wax:     Ear drops - Special ear drops can soften ear wax and help it to drain out. Ear drops are not usually safe for people with an ear infection or damage to the eardrum.     Rinsing - In some cases, a doctor or nurse can remove impacted ear wax by squirting water (or a special liquid) into the ear to rinse it out.     Special tools - A doctor or nurse might use a special tool to remove ear wax. There are different types of tools that can do this safely. These include small sticks, hooks, and spoons. There are also tools that use suction to pull the wax out.                        "

## 2024-07-30 ENCOUNTER — TELEPHONE (OUTPATIENT)
Dept: ENDOCRINOLOGY | Facility: CLINIC | Age: 74
End: 2024-07-30
Payer: MEDICARE

## 2024-07-30 NOTE — TELEPHONE ENCOUNTER
Called and LM that office received his shipment of Novolog and Tresiba and it is ready for . Provided address and office hours and contact details

## 2024-07-31 ENCOUNTER — TELEPHONE (OUTPATIENT)
Dept: ENDOCRINOLOGY | Facility: CLINIC | Age: 74
End: 2024-07-31
Payer: MEDICARE

## 2024-08-02 ENCOUNTER — LAB (OUTPATIENT)
Dept: LAB | Facility: LAB | Age: 74
End: 2024-08-02
Payer: MEDICARE

## 2024-08-02 DIAGNOSIS — C64.9 RENAL CELL CARCINOMA, UNSPECIFIED LATERALITY (MULTI): ICD-10-CM

## 2024-08-02 DIAGNOSIS — E11.69 TYPE 2 DIABETES MELLITUS WITH OTHER SPECIFIED COMPLICATION, WITH LONG-TERM CURRENT USE OF INSULIN (MULTI): ICD-10-CM

## 2024-08-02 DIAGNOSIS — E55.9 VITAMIN D DEFICIENCY: ICD-10-CM

## 2024-08-02 DIAGNOSIS — Z00.00 ANNUAL PHYSICAL EXAM: ICD-10-CM

## 2024-08-02 DIAGNOSIS — Z79.4 TYPE 2 DIABETES MELLITUS WITH OTHER SPECIFIED COMPLICATION, WITH LONG-TERM CURRENT USE OF INSULIN (MULTI): ICD-10-CM

## 2024-08-02 LAB
25(OH)D3 SERPL-MCNC: 53 NG/ML (ref 30–100)
ALBUMIN SERPL BCP-MCNC: 4.3 G/DL (ref 3.4–5)
ALP SERPL-CCNC: 68 U/L (ref 33–136)
ALT SERPL W P-5'-P-CCNC: 59 U/L (ref 10–52)
ANION GAP SERPL CALC-SCNC: 12 MMOL/L (ref 10–20)
AST SERPL W P-5'-P-CCNC: 34 U/L (ref 9–39)
BASOPHILS # BLD AUTO: 0.06 X10*3/UL (ref 0–0.1)
BASOPHILS NFR BLD AUTO: 0.8 %
BILIRUB SERPL-MCNC: 0.6 MG/DL (ref 0–1.2)
BUN SERPL-MCNC: 17 MG/DL (ref 6–23)
CALCIUM SERPL-MCNC: 9.1 MG/DL (ref 8.6–10.3)
CHLORIDE SERPL-SCNC: 103 MMOL/L (ref 98–107)
CHOLEST SERPL-MCNC: 139 MG/DL (ref 0–199)
CHOLESTEROL/HDL RATIO: 3.4
CO2 SERPL-SCNC: 31 MMOL/L (ref 21–32)
CREAT SERPL-MCNC: 0.8 MG/DL (ref 0.5–1.3)
CREAT UR-MCNC: 39.3 MG/DL (ref 20–370)
EGFRCR SERPLBLD CKD-EPI 2021: >90 ML/MIN/1.73M*2
EOSINOPHIL # BLD AUTO: 0.38 X10*3/UL (ref 0–0.4)
EOSINOPHIL NFR BLD AUTO: 4.9 %
ERYTHROCYTE [DISTWIDTH] IN BLOOD BY AUTOMATED COUNT: 12.9 % (ref 11.5–14.5)
EST. AVERAGE GLUCOSE BLD GHB EST-MCNC: 166 MG/DL
GLUCOSE SERPL-MCNC: 148 MG/DL (ref 74–99)
HBA1C MFR BLD: 7.4 %
HCT VFR BLD AUTO: 49.4 % (ref 41–52)
HDLC SERPL-MCNC: 40.4 MG/DL
HGB BLD-MCNC: 16.6 G/DL (ref 13.5–17.5)
IMM GRANULOCYTES # BLD AUTO: 0.03 X10*3/UL (ref 0–0.5)
IMM GRANULOCYTES NFR BLD AUTO: 0.4 % (ref 0–0.9)
LDLC SERPL CALC-MCNC: 66 MG/DL
LYMPHOCYTES # BLD AUTO: 1.85 X10*3/UL (ref 0.8–3)
LYMPHOCYTES NFR BLD AUTO: 23.6 %
MCH RBC QN AUTO: 33.8 PG (ref 26–34)
MCHC RBC AUTO-ENTMCNC: 33.6 G/DL (ref 32–36)
MCV RBC AUTO: 101 FL (ref 80–100)
MICROALBUMIN UR-MCNC: 27.4 MG/L
MICROALBUMIN/CREAT UR: 69.7 UG/MG CREAT
MONOCYTES # BLD AUTO: 0.78 X10*3/UL (ref 0.05–0.8)
MONOCYTES NFR BLD AUTO: 10 %
NEUTROPHILS # BLD AUTO: 4.73 X10*3/UL (ref 1.6–5.5)
NEUTROPHILS NFR BLD AUTO: 60.3 %
NON HDL CHOLESTEROL: 99 MG/DL (ref 0–149)
NRBC BLD-RTO: 0 /100 WBCS (ref 0–0)
PLATELET # BLD AUTO: 183 X10*3/UL (ref 150–450)
POTASSIUM SERPL-SCNC: 4.1 MMOL/L (ref 3.5–5.3)
PROT SERPL-MCNC: 6.5 G/DL (ref 6.4–8.2)
RBC # BLD AUTO: 4.91 X10*6/UL (ref 4.5–5.9)
SODIUM SERPL-SCNC: 142 MMOL/L (ref 136–145)
TRIGL SERPL-MCNC: 164 MG/DL (ref 0–149)
TSH SERPL-ACNC: 1.44 MIU/L (ref 0.44–3.98)
VLDL: 33 MG/DL (ref 0–40)
WBC # BLD AUTO: 7.8 X10*3/UL (ref 4.4–11.3)

## 2024-08-02 PROCEDURE — 82306 VITAMIN D 25 HYDROXY: CPT

## 2024-08-02 PROCEDURE — 83036 HEMOGLOBIN GLYCOSYLATED A1C: CPT

## 2024-08-02 PROCEDURE — 36415 COLL VENOUS BLD VENIPUNCTURE: CPT

## 2024-08-02 PROCEDURE — 82570 ASSAY OF URINE CREATININE: CPT

## 2024-08-02 PROCEDURE — 82043 UR ALBUMIN QUANTITATIVE: CPT

## 2024-08-09 ENCOUNTER — LAB (OUTPATIENT)
Dept: LAB | Facility: LAB | Age: 74
End: 2024-08-09
Payer: MEDICARE

## 2024-08-09 DIAGNOSIS — E11.42 TYPE 2 DIABETES MELLITUS WITH DIABETIC POLYNEUROPATHY (MULTI): Primary | ICD-10-CM

## 2024-08-09 LAB
CREAT UR-MCNC: 36.5 MG/DL (ref 20–370)
MICROALBUMIN UR-MCNC: 24.5 MG/L
MICROALBUMIN/CREAT UR: 67.1 UG/MG CREAT

## 2024-08-09 PROCEDURE — 82570 ASSAY OF URINE CREATININE: CPT

## 2024-08-09 PROCEDURE — 82043 UR ALBUMIN QUANTITATIVE: CPT

## 2024-08-12 ENCOUNTER — APPOINTMENT (OUTPATIENT)
Dept: VASCULAR SURGERY | Facility: CLINIC | Age: 74
End: 2024-08-12
Payer: MEDICARE

## 2024-08-12 ENCOUNTER — LAB (OUTPATIENT)
Dept: LAB | Facility: LAB | Age: 74
End: 2024-08-12
Payer: MEDICARE

## 2024-08-12 DIAGNOSIS — E11.51 TYPE 2 DIABETES MELLITUS WITH DIABETIC PERIPHERAL ANGIOPATHY WITHOUT GANGRENE (MULTI): ICD-10-CM

## 2024-08-12 DIAGNOSIS — E11.29 TYPE 2 DIABETES MELLITUS WITH OTHER DIABETIC KIDNEY COMPLICATION (MULTI): Primary | ICD-10-CM

## 2024-08-12 DIAGNOSIS — E11.42 TYPE 2 DIABETES MELLITUS WITH DIABETIC POLYNEUROPATHY (MULTI): ICD-10-CM

## 2024-08-12 LAB
ALBUMIN SERPL BCP-MCNC: 4.4 G/DL (ref 3.4–5)
ANION GAP SERPL CALC-SCNC: 12 MMOL/L (ref 10–20)
BUN SERPL-MCNC: 17 MG/DL (ref 6–23)
CALCIUM SERPL-MCNC: 9.9 MG/DL (ref 8.6–10.3)
CHLORIDE SERPL-SCNC: 102 MMOL/L (ref 98–107)
CO2 SERPL-SCNC: 33 MMOL/L (ref 21–32)
CREAT SERPL-MCNC: 0.78 MG/DL (ref 0.5–1.3)
EGFRCR SERPLBLD CKD-EPI 2021: >90 ML/MIN/1.73M*2
GLUCOSE SERPL-MCNC: 156 MG/DL (ref 74–99)
PHOSPHATE SERPL-MCNC: 4 MG/DL (ref 2.5–4.9)
POTASSIUM SERPL-SCNC: 4.6 MMOL/L (ref 3.5–5.3)
SODIUM SERPL-SCNC: 142 MMOL/L (ref 136–145)

## 2024-08-12 PROCEDURE — 36415 COLL VENOUS BLD VENIPUNCTURE: CPT

## 2024-08-14 ENCOUNTER — APPOINTMENT (OUTPATIENT)
Dept: ENDOCRINOLOGY | Facility: CLINIC | Age: 74
End: 2024-08-14
Payer: MEDICARE

## 2024-08-14 VITALS
WEIGHT: 226 LBS | HEART RATE: 83 BPM | DIASTOLIC BLOOD PRESSURE: 72 MMHG | SYSTOLIC BLOOD PRESSURE: 146 MMHG | BODY MASS INDEX: 32.43 KG/M2

## 2024-08-14 DIAGNOSIS — Z79.4 TYPE 2 DIABETES MELLITUS WITHOUT COMPLICATION, WITH LONG-TERM CURRENT USE OF INSULIN (MULTI): Primary | ICD-10-CM

## 2024-08-14 DIAGNOSIS — E11.9 TYPE 2 DIABETES MELLITUS WITHOUT COMPLICATION, WITH LONG-TERM CURRENT USE OF INSULIN (MULTI): Primary | ICD-10-CM

## 2024-08-14 PROCEDURE — 3051F HG A1C>EQUAL 7.0%<8.0%: CPT | Performed by: INTERNAL MEDICINE

## 2024-08-14 PROCEDURE — 1160F RVW MEDS BY RX/DR IN RCRD: CPT | Performed by: INTERNAL MEDICINE

## 2024-08-14 PROCEDURE — 3077F SYST BP >= 140 MM HG: CPT | Performed by: INTERNAL MEDICINE

## 2024-08-14 PROCEDURE — 3048F LDL-C <100 MG/DL: CPT | Performed by: INTERNAL MEDICINE

## 2024-08-14 PROCEDURE — 1159F MED LIST DOCD IN RCRD: CPT | Performed by: INTERNAL MEDICINE

## 2024-08-14 PROCEDURE — 1157F ADVNC CARE PLAN IN RCRD: CPT | Performed by: INTERNAL MEDICINE

## 2024-08-14 PROCEDURE — 3078F DIAST BP <80 MM HG: CPT | Performed by: INTERNAL MEDICINE

## 2024-08-14 PROCEDURE — 4010F ACE/ARB THERAPY RXD/TAKEN: CPT | Performed by: INTERNAL MEDICINE

## 2024-08-14 PROCEDURE — 3061F NEG MICROALBUMINURIA REV: CPT | Performed by: INTERNAL MEDICINE

## 2024-08-14 PROCEDURE — 99214 OFFICE O/P EST MOD 30 MIN: CPT | Performed by: INTERNAL MEDICINE

## 2024-08-14 NOTE — PROGRESS NOTES
History Of Present Illness  Gabo Weiner is a 74 y.o. male     Duration of type 2 diabetes mellitus:  20 years  Complicated by neuropathy, albuminuria, CAD, amputations      Novolog   Breakfast 6 units  Lunch 8 units  Dinner 8 units  plus corrective scale, add 1 for every 50 over glucose 101 mg/dl.     Levemir 32 units at bedtime. Patient assistance, NovoNordisk  Changing to Tresiba, received     Metformin 1000 mg BID  Jardiance 25 mg/day, prescription per Dr. Wylie.      [employer] Chelsie 2  Patient is testing glucose 288 times daily  Records reviewed, on file     Last eye exam:  December 2023    Past Medical History  He has a past medical history of Hyperlipidemia, unspecified (11/10/2022), Malignant neoplasm of prostate (Multi) (09/29/2021), Personal history of malignant neoplasm, unspecified (07/09/2014), Personal history of other diseases of the nervous system and sense organs (04/01/2020), Personal history of other diseases of the respiratory system (01/07/2016), Personal history of other malignant neoplasm of kidney (11/10/2022), and Unspecified hearing loss, unspecified ear (07/09/2014).    Surgical History  He has a past surgical history that includes Other surgical history (06/10/2013); Tonsillectomy (06/10/2013); Other surgical history (11/10/2022); Colonoscopy (12/31/2019); Colonoscopy (12/31/2019); Colonoscopy (12/31/2019); Colonoscopy (12/31/2019); Other surgical history (04/17/2014); Other surgical history (04/17/2014); and Other surgical history (04/17/2014).     Social History  He reports that he has never smoked. He has never been exposed to tobacco smoke. He has never used smokeless tobacco. He reports that he does not drink alcohol and does not use drugs.    Family History  No family history on file.    Medications  Current Outpatient Medications   Medication Instructions    aspirin 81 mg EC tablet oral, Every 24 hours    atorvastatin (Lipitor) 80 mg tablet oral    BD Juhi 2nd Gen Pen Needle 32  "gauge x 5/32\" needle 1 each 5 times a day.    cholecalciferol (Vitamin D-3) 5,000 Units tablet oral    clopidogrel (Plavix) 75 mg tablet 1 tablet, oral, Daily    docusate sodium (Colace) 100 mg capsule oral    empagliflozin (Jardiance) 25 mg 1 tablet, oral, Daily    fluoride, sodium, (Prevident 5000 Booster) 1.1 % dental paste USE AS DIRECTED    FreeStyle Chelsie 14 Day Sensor kit FOR CONTINUOUS GLUCOSE MONITORING    furosemide (Lasix) 40 mg tablet 1 tablet, oral, Daily    insulin aspart (NovoLOG Flexpen U-100 Insulin) 100 unit/mL (3 mL) pen INJECT 6 TO 10 UNITS THREE TIMES DAILY BEFORE MEALS    insulin degludec (TRESIBA FLEXTOUCH U-100) 32 Units, subcutaneous, Nightly    lisinopril 20 mg tablet oral    metFORMIN (GLUCOPHAGE) 1,000 mg, oral, 2 times daily    metoprolol tartrate (LOPRESSOR) 100 mg, oral, 2 times daily    metoprolol tartrate (LOPRESSOR) 25 mg, oral, 2 times daily    multivitamin tablet oral    primidone (MYSOLINE) 150 mg, oral, 2 times daily       Allergies  Patient has no known allergies.    Review of Systems   Constitutional:  Negative for appetite change and fever.   HENT:  Positive for tinnitus. Negative for sore throat.         Denies dry mouth   Eyes:  Negative for visual disturbance.        Dry eye     Respiratory:  Positive for apnea (sleep). Negative for cough and shortness of breath.    Cardiovascular:  Negative for chest pain.   Gastrointestinal:  Positive for diarrhea. Negative for abdominal pain, constipation, nausea and vomiting.   Endocrine: Negative for polydipsia and polyuria.   Genitourinary:  Negative for frequency.   Musculoskeletal:  Negative for arthralgias.   Skin:  Negative for rash.   Neurological:  Positive for tremors. Negative for headaches.   Psychiatric/Behavioral:  The patient is not nervous/anxious.          Last Recorded Vitals  Blood pressure 146/72, pulse 83, weight 103 kg (226 lb).    Physical Exam  Constitutional:       General: He is not in acute distress.  HENT:    "   Head: Normocephalic.      Mouth/Throat:      Mouth: Mucous membranes are moist.   Eyes:      Extraocular Movements: Extraocular movements intact.   Neck:      Thyroid: No thyroid mass or thyromegaly.   Cardiovascular:      Pulses:           Radial pulses are 2+ on the right side and 2+ on the left side.   Musculoskeletal:      Right lower leg: No edema.      Left lower leg: No edema.      Right foot: Deformity (flat foot) present.      Left foot: Deformity (TMA) present.   Lymphadenopathy:      Cervical: No cervical adenopathy.   Skin:     Comments: No foot sores   Neurological:      Mental Status: He is alert.      Motor: Tremor present.   Psychiatric:         Mood and Affect: Affect normal.          Relevant Results  Glucose   Date Value   08/12/2024 156 mg/dL (H)   08/02/2024 148 mg/dL (H)   07/31/2023 160 mg/dL (H)   07/12/2023 110 MG/DL (H)   07/10/2023 144 MG/DL (H)     Hemoglobin A1C (%)   Date Value   08/02/2024 7.4 (H)   07/31/2023 7.3 (A)   07/10/2023 7.4 (H)   01/25/2023 7.4 (H)     Bicarbonate   Date Value   08/12/2024 33 mmol/L (H)   08/02/2024 31 mmol/L   07/31/2023 31 mmol/L   07/12/2023 25 MMOL/L   07/10/2023 25 MMOL/L     Urea Nitrogen   Date Value   08/12/2024 17 mg/dL   08/02/2024 17 mg/dL   07/31/2023 18 mg/dL   07/12/2023 14 MG/DL   07/10/2023 17 MG/DL     Creatinine   Date Value   08/12/2024 0.78 mg/dL   08/02/2024 0.80 mg/dL   07/31/2023 0.76 mg/dL   07/12/2023 0.8 MG/DL   07/10/2023 0.9 MG/DL     Lab Results   Component Value Date    CHOL 139 08/02/2024    CHOL 138 07/31/2023    CHOL 122 08/08/2022     Lab Results   Component Value Date    HDL 40.4 08/02/2024    HDL 47.2 07/31/2023    HDL 36.0 (A) 08/08/2022     Lab Results   Component Value Date    LDLCALC 66 08/02/2024     Lab Results   Component Value Date    TRIG 164 (H) 08/02/2024    TRIG 147 07/31/2023    TRIG 191 (H) 08/08/2022     Lab Results   Component Value Date    TSH 1.44 08/02/2024      Latest Reference Range & Units 08/09/24  11:44   Albumin, Urine Random Not established mg/L 24.5   Creatinine, Urine Random 20.0 - 370.0 mg/dL 36.5   Albumin/Creatinine Ratio <30.0 ug/mg Creat 67.1 (H)         IMPRESSION  TYPE 2 DIABETES MELLITUS   LONG TERM CURRENT INSULIN USE   Hyperglycemic after dinner      RECOMMENDATIONS  Novolog   Breakfast 6 units  Lunch 8 units  Dinner 10 units  plus corrective scale, add 1 for every 50 over glucose 101 mg/dl.    Levemir 32 units at bedtime, change to Tresiba 32 units when you run out of Levemir.     Follow up 3-4 months  A1c at next appointment

## 2024-08-14 NOTE — PATIENT INSTRUCTIONS
RECOMMENDATIONS  Novolog   Breakfast 6 units  Lunch 8 units  Dinner 10 units  plus corrective scale, add 1 for every 50 over glucose 101 mg/dl.    Levemir 32 units at bedtime, change to Tresiba 32 units when you run out of Levemir.     Follow up 3-4 months  A1c at next appointment

## 2024-08-14 NOTE — LETTER
August 18, 2024     Libby Sullivan MD  [facility], [address] [address]    Patient: Gabo Weiner   YOB: 1950   Date of Visit: 8/14/2024       Dear Dr. Libby Sullivan MD:    Thank you for referring Gabo Weiner to me for evaluation. Below are my notes for this consultation.  If you have questions, please do not hesitate to call me. I look forward to following your patient along with you.       Sincerely,     Rogelio Moncada MD      CC: No Recipients  ______________________________________________________________________________________    History Of Present Illness  Gabo Weiner is a 74 y.o. male     Duration of type 2 diabetes mellitus:  20 years  Complicated by neuropathy, albuminuria, CAD, amputations      Novolog   Breakfast 6 units  Lunch 8 units  Dinner 8 units  plus corrective scale, add 1 for every 50 over glucose 101 mg/dl.     Levemir 32 units at bedtime. Patient assistance, NovoNordisk  Changing to Tresiba, received     Metformin 1000 mg BID  Jardiance 25 mg/day, prescription per Dr. Wylie.      FreeStyle Chelsie 2  Patient is testing glucose 288 times daily  Records reviewed, on file     Last eye exam:  December 2023    Past Medical History  He has a past medical history of Hyperlipidemia, unspecified (11/10/2022), Malignant neoplasm of prostate (Multi) (09/29/2021), Personal history of malignant neoplasm, unspecified (07/09/2014), Personal history of other diseases of the nervous system and sense organs (04/01/2020), Personal history of other diseases of the respiratory system (01/07/2016), Personal history of other malignant neoplasm of kidney (11/10/2022), and Unspecified hearing loss, unspecified ear (07/09/2014).    Surgical History  He has a past surgical history that includes Other surgical history (06/10/2013); Tonsillectomy (06/10/2013); Other surgical history (11/10/2022); Colonoscopy (12/31/2019); Colonoscopy (12/31/2019);  "Colonoscopy (12/31/2019); Colonoscopy (12/31/2019); Other surgical history (04/17/2014); Other surgical history (04/17/2014); and Other surgical history (04/17/2014).     Social History  He reports that he has never smoked. He has never been exposed to tobacco smoke. He has never used smokeless tobacco. He reports that he does not drink alcohol and does not use drugs.    Family History  No family history on file.    Medications  Current Outpatient Medications   Medication Instructions   • aspirin 81 mg EC tablet oral, Every 24 hours   • atorvastatin (Lipitor) 80 mg tablet oral   • BD Juhi 2nd Gen Pen Needle 32 gauge x 5/32\" needle 1 each 5 times a day.   • cholecalciferol (Vitamin D-3) 5,000 Units tablet oral   • clopidogrel (Plavix) 75 mg tablet 1 tablet, oral, Daily   • docusate sodium (Colace) 100 mg capsule oral   • empagliflozin (Jardiance) 25 mg 1 tablet, oral, Daily   • fluoride, sodium, (Prevident 5000 Booster) 1.1 % dental paste USE AS DIRECTED   • FreeStyle Chelsie 14 Day Sensor kit FOR CONTINUOUS GLUCOSE MONITORING   • furosemide (Lasix) 40 mg tablet 1 tablet, oral, Daily   • insulin aspart (NovoLOG Flexpen U-100 Insulin) 100 unit/mL (3 mL) pen INJECT 6 TO 10 UNITS THREE TIMES DAILY BEFORE MEALS   • insulin degludec (TRESIBA FLEXTOUCH U-100) 32 Units, subcutaneous, Nightly   • lisinopril 20 mg tablet oral   • metFORMIN (GLUCOPHAGE) 1,000 mg, oral, 2 times daily   • metoprolol tartrate (LOPRESSOR) 100 mg, oral, 2 times daily   • metoprolol tartrate (LOPRESSOR) 25 mg, oral, 2 times daily   • multivitamin tablet oral   • primidone (MYSOLINE) 150 mg, oral, 2 times daily       Allergies  Patient has no known allergies.    Review of Systems   Constitutional:  Negative for appetite change and fever.   HENT:  Positive for tinnitus. Negative for sore throat.         Denies dry mouth   Eyes:  Negative for visual disturbance.        Dry eye     Respiratory:  Positive for apnea (sleep). Negative for cough and shortness " of breath.    Cardiovascular:  Negative for chest pain.   Gastrointestinal:  Positive for diarrhea. Negative for abdominal pain, constipation, nausea and vomiting.   Endocrine: Negative for polydipsia and polyuria.   Genitourinary:  Negative for frequency.   Musculoskeletal:  Negative for arthralgias.   Skin:  Negative for rash.   Neurological:  Positive for tremors. Negative for headaches.   Psychiatric/Behavioral:  The patient is not nervous/anxious.          Last Recorded Vitals  Blood pressure 146/72, pulse 83, weight 103 kg (226 lb).    Physical Exam  Constitutional:       General: He is not in acute distress.  HENT:      Head: Normocephalic.      Mouth/Throat:      Mouth: Mucous membranes are moist.   Eyes:      Extraocular Movements: Extraocular movements intact.   Neck:      Thyroid: No thyroid mass or thyromegaly.   Cardiovascular:      Pulses:           Radial pulses are 2+ on the right side and 2+ on the left side.   Musculoskeletal:      Right lower leg: No edema.      Left lower leg: No edema.      Right foot: Deformity (flat foot) present.      Left foot: Deformity (TMA) present.   Lymphadenopathy:      Cervical: No cervical adenopathy.   Skin:     Comments: No foot sores   Neurological:      Mental Status: He is alert.      Motor: Tremor present.   Psychiatric:         Mood and Affect: Affect normal.          Relevant Results  Glucose   Date Value   08/12/2024 156 mg/dL (H)   08/02/2024 148 mg/dL (H)   07/31/2023 160 mg/dL (H)   07/12/2023 110 MG/DL (H)   07/10/2023 144 MG/DL (H)     Hemoglobin A1C (%)   Date Value   08/02/2024 7.4 (H)   07/31/2023 7.3 (A)   07/10/2023 7.4 (H)   01/25/2023 7.4 (H)     Bicarbonate   Date Value   08/12/2024 33 mmol/L (H)   08/02/2024 31 mmol/L   07/31/2023 31 mmol/L   07/12/2023 25 MMOL/L   07/10/2023 25 MMOL/L     Urea Nitrogen   Date Value   08/12/2024 17 mg/dL   08/02/2024 17 mg/dL   07/31/2023 18 mg/dL   07/12/2023 14 MG/DL   07/10/2023 17 MG/DL     Creatinine    Date Value   08/12/2024 0.78 mg/dL   08/02/2024 0.80 mg/dL   07/31/2023 0.76 mg/dL   07/12/2023 0.8 MG/DL   07/10/2023 0.9 MG/DL     Lab Results   Component Value Date    CHOL 139 08/02/2024    CHOL 138 07/31/2023    CHOL 122 08/08/2022     Lab Results   Component Value Date    HDL 40.4 08/02/2024    HDL 47.2 07/31/2023    HDL 36.0 (A) 08/08/2022     Lab Results   Component Value Date    LDLCALC 66 08/02/2024     Lab Results   Component Value Date    TRIG 164 (H) 08/02/2024    TRIG 147 07/31/2023    TRIG 191 (H) 08/08/2022     Lab Results   Component Value Date    TSH 1.44 08/02/2024      Latest Reference Range & Units 08/09/24 11:44   Albumin, Urine Random Not established mg/L 24.5   Creatinine, Urine Random 20.0 - 370.0 mg/dL 36.5   Albumin/Creatinine Ratio <30.0 ug/mg Creat 67.1 (H)         IMPRESSION  TYPE 2 DIABETES MELLITUS   LONG TERM CURRENT INSULIN USE   Hyperglycemic after dinner      RECOMMENDATIONS  Novolog   Breakfast 6 units  Lunch 8 units  Dinner 10 units  plus corrective scale, add 1 for every 50 over glucose 101 mg/dl.    Levemir 32 units at bedtime, change to Tresiba 32 units when you run out of Levemir.     Follow up 3-4 months  A1c at next appointment

## 2024-08-18 ASSESSMENT — ENCOUNTER SYMPTOMS
TREMORS: 1
NERVOUS/ANXIOUS: 0
CONSTIPATION: 0
APPETITE CHANGE: 0
NAUSEA: 0
HEADACHES: 0
DIARRHEA: 1
ARTHRALGIAS: 0
FREQUENCY: 0
COUGH: 0
SORE THROAT: 0
VOMITING: 0
ABDOMINAL PAIN: 0
POLYDIPSIA: 0
APNEA: 1
FEVER: 0
SHORTNESS OF BREATH: 0

## 2024-08-19 ENCOUNTER — APPOINTMENT (OUTPATIENT)
Dept: PRIMARY CARE | Facility: CLINIC | Age: 74
End: 2024-08-19
Payer: MEDICARE

## 2024-08-19 VITALS
OXYGEN SATURATION: 95 % | BODY MASS INDEX: 32.31 KG/M2 | TEMPERATURE: 97.9 F | SYSTOLIC BLOOD PRESSURE: 128 MMHG | HEART RATE: 68 BPM | WEIGHT: 225.2 LBS | DIASTOLIC BLOOD PRESSURE: 68 MMHG

## 2024-08-19 DIAGNOSIS — E66.09 CLASS 1 OBESITY DUE TO EXCESS CALORIES WITH SERIOUS COMORBIDITY AND BODY MASS INDEX (BMI) OF 32.0 TO 32.9 IN ADULT: ICD-10-CM

## 2024-08-19 DIAGNOSIS — E11.9 TYPE 2 DIABETES MELLITUS WITHOUT COMPLICATION, WITH LONG-TERM CURRENT USE OF INSULIN (MULTI): ICD-10-CM

## 2024-08-19 DIAGNOSIS — Z79.4 TYPE 2 DIABETES MELLITUS WITHOUT COMPLICATION, WITH LONG-TERM CURRENT USE OF INSULIN (MULTI): ICD-10-CM

## 2024-08-19 DIAGNOSIS — I73.9 PVD (PERIPHERAL VASCULAR DISEASE) (CMS-HCC): ICD-10-CM

## 2024-08-19 DIAGNOSIS — Z71.2 ENCOUNTER TO DISCUSS TEST RESULTS: Primary | ICD-10-CM

## 2024-08-19 DIAGNOSIS — C61 ADENOCARCINOMA OF PROSTATE (MULTI): ICD-10-CM

## 2024-08-19 DIAGNOSIS — Z95.1 S/P CABG X 5: ICD-10-CM

## 2024-08-19 DIAGNOSIS — E55.9 VITAMIN D DEFICIENCY: ICD-10-CM

## 2024-08-19 DIAGNOSIS — I11.0 HYPERTENSIVE HEART DISEASE WITH HEART FAILURE (MULTI): ICD-10-CM

## 2024-08-19 DIAGNOSIS — R25.1 TREMOR: ICD-10-CM

## 2024-08-19 PROCEDURE — 99214 OFFICE O/P EST MOD 30 MIN: CPT | Performed by: INTERNAL MEDICINE

## 2024-08-19 PROCEDURE — 1159F MED LIST DOCD IN RCRD: CPT | Performed by: INTERNAL MEDICINE

## 2024-08-19 PROCEDURE — 1157F ADVNC CARE PLAN IN RCRD: CPT | Performed by: INTERNAL MEDICINE

## 2024-08-19 PROCEDURE — 1160F RVW MEDS BY RX/DR IN RCRD: CPT | Performed by: INTERNAL MEDICINE

## 2024-08-19 PROCEDURE — 3078F DIAST BP <80 MM HG: CPT | Performed by: INTERNAL MEDICINE

## 2024-08-19 PROCEDURE — 3074F SYST BP LT 130 MM HG: CPT | Performed by: INTERNAL MEDICINE

## 2024-08-19 PROCEDURE — 1036F TOBACCO NON-USER: CPT | Performed by: INTERNAL MEDICINE

## 2024-08-19 PROCEDURE — 4010F ACE/ARB THERAPY RXD/TAKEN: CPT | Performed by: INTERNAL MEDICINE

## 2024-08-19 PROCEDURE — 3061F NEG MICROALBUMINURIA REV: CPT | Performed by: INTERNAL MEDICINE

## 2024-08-19 PROCEDURE — 3051F HG A1C>EQUAL 7.0%<8.0%: CPT | Performed by: INTERNAL MEDICINE

## 2024-08-19 PROCEDURE — G0446 INTENS BEHAVE THER CARDIO DX: HCPCS | Performed by: INTERNAL MEDICINE

## 2024-08-19 PROCEDURE — 3048F LDL-C <100 MG/DL: CPT | Performed by: INTERNAL MEDICINE

## 2024-08-19 NOTE — PROGRESS NOTES
Subjective   Patient ID: Gabo Weiner is a 74 y.o. male who presents for 3 month follow up .  HPI    Routine follow up        Labs rev'd         Feels well     Left foot rest of toes amputated (no toes now)  Healed well  Podiatry following     CAD / cholesterol stable on rx no side effects  cardio following / Dr Wylie     Hand shaking tremor is worse on rx dose increased no side effects  neuro following     DM stable on rx no side effects   this am  HBA1C 7.4   8-24  neuropathy, microalbuminuria  check blood sugars per endo  endo following  / Dr Moncada  podiatry following / Dr Tavares         PVRODRIGUEZ  asymptomatic  Had left leg balloon angioplasty / vasc following ( Dr Neves)  Follow up ophtho     hypertension stable on rx no side effects     vit D stable on rx no side effects     renal cancer and prostate cancer stable  urology released     diet / exercise rev'd     Colonoscopy ordered due 12-24    Review of Systems   All other systems reviewed and are negative.      Objective   /68   Pulse 68   Temp 36.6 °C (97.9 °F)   Wt 102 kg (225 lb 3.2 oz)   SpO2 95%   BMI 32.31 kg/m²   Lab Results   Component Value Date    WBC 7.8 08/02/2024    HGB 16.6 08/02/2024    HCT 49.4 08/02/2024     08/02/2024    CHOL 139 08/02/2024    TRIG 164 (H) 08/02/2024    HDL 40.4 08/02/2024    ALT 59 (H) 08/02/2024    AST 34 08/02/2024     08/12/2024    K 4.6 08/12/2024     08/12/2024    CREATININE 0.78 08/12/2024    BUN 17 08/12/2024    CO2 33 (H) 08/12/2024    TSH 1.44 08/02/2024    PSA 0.16 11/09/2023    INR 1.0 07/10/2023    HGBA1C 7.4 (H) 08/02/2024    ALBUR 139.3 12/15/2017           Physical Exam  Vitals reviewed.   Constitutional:       Appearance: Normal appearance. He is obese.   HENT:      Head: Normocephalic and atraumatic.      Mouth/Throat:      Pharynx: No posterior oropharyngeal erythema.   Eyes:      General: No scleral icterus.     Conjunctiva/sclera: Conjunctivae normal.      Pupils: Pupils  are equal, round, and reactive to light.   Cardiovascular:      Rate and Rhythm: Normal rate and regular rhythm.      Heart sounds: Normal heart sounds.   Pulmonary:      Effort: No respiratory distress.      Breath sounds: No wheezing.   Abdominal:      General: Abdomen is flat. Bowel sounds are normal. There is no distension.      Palpations: Abdomen is soft. There is no mass.      Tenderness: There is no abdominal tenderness. There is no rebound.   Musculoskeletal:         General: Normal range of motion.      Cervical back: Normal range of motion and neck supple.   Skin:     General: Skin is warm and dry.   Neurological:      General: No focal deficit present.      Mental Status: He is alert and oriented to person, place, and time. Mental status is at baseline.   Psychiatric:         Mood and Affect: Mood normal.         Behavior: Behavior normal.         Thought Content: Thought content normal.         Judgment: Judgment normal.         Problem List Items Addressed This Visit             ICD-10-CM    Adenocarcinoma of prostate (Multi) C61    Diabetes mellitus (Multi) E11.9    Obesity E66.9    S/P CABG x 5 Z95.1    Tremor R25.1    Vitamin D deficiency E55.9    Hypertensive heart disease with heart failure (Multi) I11.0     Other Visit Diagnoses         Codes    Encounter to discuss test results    -  Primary Z71.2    PVD (peripheral vascular disease) (CMS-[facility])     I73.9          Assessment/Plan          Labs rev'd         Feels well     Left foot rest of toes amputated (no toes now)  Healed well  Podiatry following     CAD / cholesterol stable on rx no side effects  cardio following / Dr Wylie  I spent 15 minutes face-to-face with this individual discussing their cardiovascular risk and behavioral therapies of nutritional choices, exercise, and elimination of habits contributing to risk. We agreed on plan how they may be able to reduce their current cardiovascular risk.  Patient 10 year cardiac risk estimate  calculates :  38.4 %      Hand shaking tremor is worse on rx dose increased no side effects  neuro following     DM stable on rx no side effects   this am  HBA1C 7.4   8-24  neuropathy, microalbuminuria  check blood sugars per endo  endo following  / Dr Moncada  podiatry following / Dr Tavares         PVD  asymptomatic  Had left leg balloon angioplasty / vasc following ( Dr Neves)  Follow up ophtho     hypertension stable on rx no side effects     vit D stable on rx no side effects     renal cancer and prostate cancer stable  urology released     diet / exercise rev'd     Colonoscopy ordered due 12-24    Follow up 3 months

## 2024-08-20 ENCOUNTER — APPOINTMENT (OUTPATIENT)
Dept: VASCULAR SURGERY | Facility: CLINIC | Age: 74
End: 2024-08-20
Payer: MEDICARE

## 2024-08-25 DIAGNOSIS — E11.9 TYPE 2 DIABETES MELLITUS WITHOUT COMPLICATIONS (MULTI): ICD-10-CM

## 2024-08-25 RX ORDER — METFORMIN HYDROCHLORIDE 1000 MG/1
1000 TABLET ORAL 2 TIMES DAILY
Qty: 180 TABLET | Refills: 3 | Status: SHIPPED | OUTPATIENT
Start: 2024-08-25

## 2024-09-24 ENCOUNTER — APPOINTMENT (OUTPATIENT)
Dept: NEUROLOGY | Facility: CLINIC | Age: 74
End: 2024-09-24
Payer: MEDICARE

## 2024-09-24 VITALS — BODY MASS INDEX: 32.86 KG/M2 | HEART RATE: 83 BPM | TEMPERATURE: 97.3 F | WEIGHT: 229 LBS

## 2024-09-24 DIAGNOSIS — G25.0 ESSENTIAL TREMOR: Primary | ICD-10-CM

## 2024-09-24 PROCEDURE — 4010F ACE/ARB THERAPY RXD/TAKEN: CPT | Performed by: STUDENT IN AN ORGANIZED HEALTH CARE EDUCATION/TRAINING PROGRAM

## 2024-09-24 PROCEDURE — 3051F HG A1C>EQUAL 7.0%<8.0%: CPT | Performed by: STUDENT IN AN ORGANIZED HEALTH CARE EDUCATION/TRAINING PROGRAM

## 2024-09-24 PROCEDURE — 3048F LDL-C <100 MG/DL: CPT | Performed by: STUDENT IN AN ORGANIZED HEALTH CARE EDUCATION/TRAINING PROGRAM

## 2024-09-24 PROCEDURE — 1036F TOBACCO NON-USER: CPT | Performed by: STUDENT IN AN ORGANIZED HEALTH CARE EDUCATION/TRAINING PROGRAM

## 2024-09-24 PROCEDURE — 1126F AMNT PAIN NOTED NONE PRSNT: CPT | Performed by: STUDENT IN AN ORGANIZED HEALTH CARE EDUCATION/TRAINING PROGRAM

## 2024-09-24 PROCEDURE — 1159F MED LIST DOCD IN RCRD: CPT | Performed by: STUDENT IN AN ORGANIZED HEALTH CARE EDUCATION/TRAINING PROGRAM

## 2024-09-24 PROCEDURE — 3061F NEG MICROALBUMINURIA REV: CPT | Performed by: STUDENT IN AN ORGANIZED HEALTH CARE EDUCATION/TRAINING PROGRAM

## 2024-09-24 PROCEDURE — 99215 OFFICE O/P EST HI 40 MIN: CPT | Performed by: STUDENT IN AN ORGANIZED HEALTH CARE EDUCATION/TRAINING PROGRAM

## 2024-09-24 PROCEDURE — 1157F ADVNC CARE PLAN IN RCRD: CPT | Performed by: STUDENT IN AN ORGANIZED HEALTH CARE EDUCATION/TRAINING PROGRAM

## 2024-09-24 ASSESSMENT — PAIN SCALES - GENERAL: PAINLEVEL: 0-NO PAIN

## 2024-09-24 NOTE — PATIENT INSTRUCTIONS
It was a pleasure seeing you today.    As we discussed we will continue to increase your primidone  Take 3 pills in the morning, 4 at bedtime for about a month  Then take 4 pills twice a day for about a month  If no side effects you can increase to 4 in the morning and 5 at night for about a month.  If no side effects you can then increase to 5 in the morning and 5 at night.    In the future we can consider adding a medication called topiramate. We also discussed deep brain stimulation.    If you have any questions or concerns please call my office at [phone].

## 2024-09-24 NOTE — PROGRESS NOTES
Date of Service: 9/24/2024  Patient: Gabo Weiner  MRN: [id number]  Referring Provider: Sean Schroeder, *  PCP: Libby Sullivan MD    History of Present Illness:   Mr. Weiner is a 74 y.o. male who presents to neurology clinic for evaluation of essential tremor. Gabo Weiner's past medical history is pertinent for T2DM, CKD, HTN. He is a previous patient of Dr. Schroeder.    The patient has a 6-8 year history of essential tremor.  It bothers him with eating, writing and working with tools. This has worsened over the years. He denies any bradykinesia, rigidity, rest tremor.  No change in his voice.  No micrographia.  No shuffling gait.  Family history is negative for tremor.    He is currently on primidone 150 mg twice a day. He denies any side effects. Primidone helped initially started but less so now.  He has not been any additional medications for essential tremor.    Review of Systems:  The systems were reviewed with pertinent positives and negatives documented in the HPI.      Past Medical & Surgical History  Past Medical History:   Diagnosis Date    Hyperlipidemia, unspecified 11/10/2022    Hyperlipidemia    Malignant neoplasm of prostate (Multi) 09/29/2021    Adenocarcinoma of prostate    Personal history of malignant neoplasm, unspecified 07/09/2014    Personal history of malignant neoplasm    Personal history of other diseases of the nervous system and sense organs 04/01/2020    History of impacted cerumen    Personal history of other diseases of the respiratory system 01/07/2016    History of acute sinusitis    Personal history of other malignant neoplasm of kidney 11/10/2022    History of renal cell carcinoma    Unspecified hearing loss, unspecified ear 07/09/2014    Problems with hearing     Past Surgical History:   Procedure Laterality Date    COLONOSCOPY  12/31/2019    Complete Colonoscopy    COLONOSCOPY  12/31/2019    Complete Colonoscopy    COLONOSCOPY  12/31/2019    Complete  "Colonoscopy    COLONOSCOPY  12/31/2019    Complete Colonoscopy    OTHER SURGICAL HISTORY  06/10/2013    Needle Biopsy Of Prostate    OTHER SURGICAL HISTORY  11/10/2022    Hypertension    OTHER SURGICAL HISTORY  04/17/2014    History Of Prior Surgery    OTHER SURGICAL HISTORY  04/17/2014    Nephrectomy    OTHER SURGICAL HISTORY  04/17/2014    History Of Prior Surgery    TONSILLECTOMY  06/10/2013    Tonsillectomy     Social History:   Social History     Tobacco Use    Smoking status: Never     Passive exposure: Never    Smokeless tobacco: Never    Tobacco comments:     Used smokeless tobacco   Substance Use Topics    Alcohol use: Never     Family History:   No known family history of essential tremor.     Medications:     Current Outpatient Medications:     aspirin 81 mg EC tablet, Take by mouth once every 24 hours., Disp: , Rfl:     atorvastatin (Lipitor) 80 mg tablet, Take by mouth., Disp: , Rfl:     BD Juhi 2nd Gen Pen Needle 32 gauge x 5/32\" needle, 1 each 5 times a day., Disp: , Rfl:     cholecalciferol (Vitamin D-3) 5,000 Units tablet, Take by mouth., Disp: , Rfl:     clopidogrel (Plavix) 75 mg tablet, Take 1 tablet (75 mg) by mouth once daily., Disp: , Rfl:     docusate sodium (Colace) 100 mg capsule, Take by mouth., Disp: , Rfl:     empagliflozin (Jardiance) 25 mg, Take 1 tablet (25 mg) by mouth once daily., Disp: , Rfl:     fluoride, sodium, (Prevident 5000 Booster) 1.1 % dental paste, USE AS DIRECTED, Disp: , Rfl:     FreeStyle Chelsie 14 Day Sensor kit, FOR CONTINUOUS GLUCOSE MONITORING, Disp: , Rfl:     furosemide (Lasix) 40 mg tablet, Take 1 tablet (40 mg) by mouth once daily., Disp: , Rfl:     insulin aspart (NovoLOG Flexpen U-100 Insulin) 100 unit/mL (3 mL) pen, INJECT 6 TO 10 UNITS THREE TIMES DAILY BEFORE MEALS, Disp: 30 mL, Rfl: 3    insulin degludec (Tresiba FlexTouch U-100) 100 unit/mL (3 mL) injection, Inject 32 Units under the skin once daily at bedtime., Disp: 3 mL, Rfl: 12    lisinopril 20 mg " tablet, Take by mouth., Disp: , Rfl:     metFORMIN (Glucophage) 1,000 mg tablet, TAKE 1 TABLET BY MOUTH TWICE A DAY, Disp: 180 tablet, Rfl: 3    metoprolol tartrate (Lopressor) 100 mg tablet, Take 1 tablet (100 mg) by mouth 2 times a day., Disp: , Rfl:     metoprolol tartrate (Lopressor) 25 mg tablet, Take 1 tablet (25 mg) by mouth 2 times a day., Disp: 60 tablet, Rfl: 0    multivitamin tablet, Take by mouth., Disp: , Rfl:     primidone (Mysoline) 50 mg tablet, Take 3 tablets (150 mg) by mouth 2 times a day., Disp: 540 tablet, Rfl: 3     General Physical Exam:  There were no vitals taken for this visit.     He looks well and is not in any acute distress. Breathing comfortably on room air.     Neurological Exam:   Mental status reveals him to be alert and oriented. Speech is intact to conversation.     Cranial nerves:  CN 2   Visual fields full to confrontation.   CN 3, 4, 6   Pupils round, 4 mm in diameter, equally reactive to light. Lids symmetric; no ptosis. EOMs normal alignment, full range.   No nystagmus.   CN 5   Facial sensation intact bilaterally.   CN 7   Normal and symmetric facial strength. Nasolabial folds symmetric.   CN 8   Hearing intact to conversation.   CN 9   Palate elevates symmetrically.   CN 11   Normal strength of shoulder shrug and neck turning.   CN 12   Tongue midline, with normal bulk and strength; no fasciculations.      Motor:  Muscle tone: Normal in both upper and lower extremities.  Movements: bilateral action and postural tremor, worse on left, no-no head tremor    R L   5 5 Shoulder abduction  5 5 Elbow flexion  5 5 Elbow extension  5 5 Finger abduction  5 5 Hip flexion  5 5 Knee flexion  5 5 Knee extension  5 5 Ankle dorsiflexion  5 5 Ankle plantarflexion     Reflexes                         R     L  Triceps          1     1  Biceps           1     1  Brachiorad    1     1  Patellar         1      1   Achilles         1      1     Sensory:   Light Touch: Diminished at feet      Coordination:  In both upper extremities, finger-nose-finger shows bilateral intention tremor.   In both lower extremities, heel-to-shin was intact. GISELL were intact in both upper and lower extremities.      Gait:  Station was stable with a normal base. Gait was stable with a normal arm swing and speed.     Results:    Lab Results   Component Value Date    HGBA1C 7.4 (H) 08/02/2024     Lab Results   Component Value Date    TSH 1.44 08/02/2024      Lab Results   Component Value Date    CKTOTAL 63 03/10/2023     CBC:   Lab Results   Component Value Date    WBC 7.8 08/02/2024    HGB 16.6 08/02/2024    HCT 49.4 08/02/2024     08/02/2024     BMP:   Lab Results   Component Value Date     08/12/2024    K 4.6 08/12/2024     08/12/2024    CO2 33 (H) 08/12/2024    BUN 17 08/12/2024    CREATININE 0.78 08/12/2024    CALCIUM 9.9 08/12/2024    MG 2.00 03/16/2020    PHOS 4.0 08/12/2024     LFT:   Lab Results   Component Value Date    ALKPHOS 68 08/02/2024    BILITOT 0.6 08/02/2024    PROT 6.5 08/02/2024    ALBUMIN 4.4 08/12/2024    ALT 59 (H) 08/02/2024    AST 34 08/02/2024     Impression:  Gabo Weiner is a 74 y.o. who presents with longstanding essential tremor currently on primidone 150 mg twice a day.  No side effects. Recent CBC and CMP okay.  No evidence of parkinsonism on exam.    He is currently on metoprolol through his cardiologist.  We discussed increasing the primidone versus adding topiramate.  He prefers to increase the primidone at this time.  We also briefly touched on deep brain stimulation and we can continue to have this discussion in the future.    Plan:  -Increase primidone to 150 mg in the morning, 200 mg at bedtime for a month  -If tolerating can increase to 200 mg twice a day for a month  -If tolerating can increase to 200 mg in the morning and 250mg at bedtime for a month  -If tolerating can increase to 250 mg twice a day.    He can stop at a lower dose if improvement in tremor.   Side effects were discussed.  He will call the office if he has questions or concerns.    He will follow-up in 6 months.     Reviewed and approved by KURT MUNOZ on 9/24/24 at 7:16 AM.    I personally spent 40 minutes on the day of the visit completing the review of the medical record and outside records, obtaining history and performing an appropriate physical exam, patient care, counseling and education, placing orders, independently reviewing results, communicating with the patient, coordinating care and performing appropriate clinical documentation.

## 2024-09-26 DIAGNOSIS — R25.1 TREMOR: ICD-10-CM

## 2024-09-26 RX ORDER — PRIMIDONE 50 MG/1
150 TABLET ORAL 2 TIMES DAILY
Qty: 540 TABLET | Refills: 3 | Status: SHIPPED | OUTPATIENT
Start: 2024-09-26

## 2024-09-26 NOTE — TELEPHONE ENCOUNTER
Pt needs refill of primidone 50 mg tablet, Take 3 tablets (150 mg) po BID sent to [facility]

## 2024-10-08 ENCOUNTER — OFFICE VISIT (OUTPATIENT)
Dept: VASCULAR SURGERY | Facility: CLINIC | Age: 74
End: 2024-10-08
Payer: MEDICARE

## 2024-10-08 VITALS — DIASTOLIC BLOOD PRESSURE: 84 MMHG | SYSTOLIC BLOOD PRESSURE: 155 MMHG | HEART RATE: 70 BPM | RESPIRATION RATE: 18 BRPM

## 2024-10-08 DIAGNOSIS — I73.9 PAD (PERIPHERAL ARTERY DISEASE) (CMS-HCC): Primary | ICD-10-CM

## 2024-10-08 PROCEDURE — 1157F ADVNC CARE PLAN IN RCRD: CPT | Performed by: SURGERY

## 2024-10-08 PROCEDURE — 3077F SYST BP >= 140 MM HG: CPT | Performed by: SURGERY

## 2024-10-08 PROCEDURE — 1159F MED LIST DOCD IN RCRD: CPT | Performed by: SURGERY

## 2024-10-08 PROCEDURE — 3079F DIAST BP 80-89 MM HG: CPT | Performed by: SURGERY

## 2024-10-08 PROCEDURE — 3051F HG A1C>EQUAL 7.0%<8.0%: CPT | Performed by: SURGERY

## 2024-10-08 PROCEDURE — 4010F ACE/ARB THERAPY RXD/TAKEN: CPT | Performed by: SURGERY

## 2024-10-08 PROCEDURE — 99212 OFFICE O/P EST SF 10 MIN: CPT | Performed by: SURGERY

## 2024-10-08 PROCEDURE — 1036F TOBACCO NON-USER: CPT | Performed by: SURGERY

## 2024-10-08 PROCEDURE — 1126F AMNT PAIN NOTED NONE PRSNT: CPT | Performed by: SURGERY

## 2024-10-08 PROCEDURE — 3048F LDL-C <100 MG/DL: CPT | Performed by: SURGERY

## 2024-10-08 PROCEDURE — 99202 OFFICE O/P NEW SF 15 MIN: CPT | Performed by: SURGERY

## 2024-10-08 PROCEDURE — 3061F NEG MICROALBUMINURIA REV: CPT | Performed by: SURGERY

## 2024-10-08 RX ORDER — CETIRIZINE HYDROCHLORIDE 10 MG/1
10 TABLET ORAL DAILY
COMMUNITY

## 2024-10-08 ASSESSMENT — LIFESTYLE VARIABLES
HOW MANY STANDARD DRINKS CONTAINING ALCOHOL DO YOU HAVE ON A TYPICAL DAY: PATIENT DOES NOT DRINK
HOW OFTEN DO YOU HAVE SIX OR MORE DRINKS ON ONE OCCASION: NEVER
SKIP TO QUESTIONS 9-10: 1
HOW OFTEN DO YOU HAVE A DRINK CONTAINING ALCOHOL: NEVER
AUDIT-C TOTAL SCORE: 0

## 2024-10-08 ASSESSMENT — PAIN SCALES - GENERAL: PAINLEVEL: 0-NO PAIN

## 2024-10-08 ASSESSMENT — PATIENT HEALTH QUESTIONNAIRE - PHQ9
2. FEELING DOWN, DEPRESSED OR HOPELESS: NOT AT ALL
SUM OF ALL RESPONSES TO PHQ9 QUESTIONS 1 AND 2: 0
1. LITTLE INTEREST OR PLEASURE IN DOING THINGS: NOT AT ALL

## 2024-10-08 ASSESSMENT — ENCOUNTER SYMPTOMS
LOSS OF SENSATION IN FEET: 0
OCCASIONAL FEELINGS OF UNSTEADINESS: 0
DEPRESSION: 0

## 2024-10-08 NOTE — PROGRESS NOTES
Patient is status post a left TMA and revascularization with balloon angioplasty of his right femoral-popliteal segment according to his history.  He has issues with hypercholesterolemia, type II but diabetes, and hypertension.  He has not had a recent heart attack or stroke.  He has no complaints of pain or claudication in either leg.  He is here to establish care.  He was due for a follow-up in July but his surgeon Dr. Neves has left the area.  He has a resting tremor in the right hand.  He has a soft abdomen.  He has warm and perfused lower extremities but no palpable pulses below the knees.    I am going to order pulse volume recordings to establish a baseline flow index.  I will see him after testing.  Total time with patient was 15 minutes.

## 2024-10-21 ENCOUNTER — HOSPITAL ENCOUNTER (OUTPATIENT)
Dept: VASCULAR MEDICINE | Facility: CLINIC | Age: 74
Discharge: HOME | End: 2024-10-21
Payer: MEDICARE

## 2024-10-21 DIAGNOSIS — I73.9 PAD (PERIPHERAL ARTERY DISEASE) (CMS-HCC): ICD-10-CM

## 2024-10-21 PROCEDURE — 93923 UPR/LXTR ART STDY 3+ LVLS: CPT

## 2024-10-21 PROCEDURE — 93923 UPR/LXTR ART STDY 3+ LVLS: CPT | Performed by: STUDENT IN AN ORGANIZED HEALTH CARE EDUCATION/TRAINING PROGRAM

## 2024-10-29 ENCOUNTER — TELEPHONE (OUTPATIENT)
Dept: ENDOCRINOLOGY | Facility: CLINIC | Age: 74
End: 2024-10-29
Payer: MEDICARE

## 2024-10-29 ENCOUNTER — OFFICE VISIT (OUTPATIENT)
Dept: VASCULAR SURGERY | Facility: CLINIC | Age: 74
End: 2024-10-29
Payer: MEDICARE

## 2024-10-29 VITALS
HEART RATE: 69 BPM | SYSTOLIC BLOOD PRESSURE: 148 MMHG | WEIGHT: 220 LBS | BODY MASS INDEX: 31.57 KG/M2 | OXYGEN SATURATION: 95 % | DIASTOLIC BLOOD PRESSURE: 77 MMHG

## 2024-10-29 DIAGNOSIS — I73.9 PAD (PERIPHERAL ARTERY DISEASE) (CMS-HCC): Primary | ICD-10-CM

## 2024-10-29 PROCEDURE — 3061F NEG MICROALBUMINURIA REV: CPT | Performed by: SURGERY

## 2024-10-29 PROCEDURE — 1157F ADVNC CARE PLAN IN RCRD: CPT | Performed by: SURGERY

## 2024-10-29 PROCEDURE — 3051F HG A1C>EQUAL 7.0%<8.0%: CPT | Performed by: SURGERY

## 2024-10-29 PROCEDURE — 1036F TOBACCO NON-USER: CPT | Performed by: SURGERY

## 2024-10-29 PROCEDURE — 1159F MED LIST DOCD IN RCRD: CPT | Performed by: SURGERY

## 2024-10-29 PROCEDURE — 3078F DIAST BP <80 MM HG: CPT | Performed by: SURGERY

## 2024-10-29 PROCEDURE — 3077F SYST BP >= 140 MM HG: CPT | Performed by: SURGERY

## 2024-10-29 PROCEDURE — 3048F LDL-C <100 MG/DL: CPT | Performed by: SURGERY

## 2024-10-29 PROCEDURE — 4010F ACE/ARB THERAPY RXD/TAKEN: CPT | Performed by: SURGERY

## 2024-10-29 PROCEDURE — 99212 OFFICE O/P EST SF 10 MIN: CPT | Performed by: SURGERY

## 2024-10-29 ASSESSMENT — PATIENT HEALTH QUESTIONNAIRE - PHQ9
2. FEELING DOWN, DEPRESSED OR HOPELESS: NOT AT ALL
1. LITTLE INTEREST OR PLEASURE IN DOING THINGS: NOT AT ALL
SUM OF ALL RESPONSES TO PHQ9 QUESTIONS 1 AND 2: 0

## 2024-10-29 ASSESSMENT — ENCOUNTER SYMPTOMS
DEPRESSION: 0
LOSS OF SENSATION IN FEET: 1
OCCASIONAL FEELINGS OF UNSTEADINESS: 0

## 2024-11-18 ENCOUNTER — APPOINTMENT (OUTPATIENT)
Dept: PRIMARY CARE | Facility: CLINIC | Age: 74
End: 2024-11-18
Payer: MEDICARE

## 2024-11-18 VITALS
HEART RATE: 65 BPM | DIASTOLIC BLOOD PRESSURE: 50 MMHG | OXYGEN SATURATION: 95 % | SYSTOLIC BLOOD PRESSURE: 108 MMHG | WEIGHT: 228.4 LBS | BODY MASS INDEX: 32.77 KG/M2 | TEMPERATURE: 97.3 F

## 2024-11-18 DIAGNOSIS — I11.0 HYPERTENSIVE HEART DISEASE WITH HEART FAILURE: ICD-10-CM

## 2024-11-18 DIAGNOSIS — E66.09 CLASS 1 OBESITY DUE TO EXCESS CALORIES WITH SERIOUS COMORBIDITY AND BODY MASS INDEX (BMI) OF 32.0 TO 32.9 IN ADULT: ICD-10-CM

## 2024-11-18 DIAGNOSIS — R25.1 TREMOR: ICD-10-CM

## 2024-11-18 DIAGNOSIS — I73.9 PVD (PERIPHERAL VASCULAR DISEASE) (CMS-HCC): ICD-10-CM

## 2024-11-18 DIAGNOSIS — C61 ADENOCARCINOMA OF PROSTATE (MULTI): ICD-10-CM

## 2024-11-18 DIAGNOSIS — E78.00 HYPERCHOLESTEREMIA: ICD-10-CM

## 2024-11-18 DIAGNOSIS — I25.810 CORONARY ARTERY DISEASE INVOLVING CORONARY BYPASS GRAFT OF NATIVE HEART WITHOUT ANGINA PECTORIS: ICD-10-CM

## 2024-11-18 DIAGNOSIS — E55.9 VITAMIN D DEFICIENCY: ICD-10-CM

## 2024-11-18 DIAGNOSIS — E11.9 TYPE 2 DIABETES MELLITUS WITHOUT COMPLICATION, WITH LONG-TERM CURRENT USE OF INSULIN (MULTI): Primary | ICD-10-CM

## 2024-11-18 DIAGNOSIS — Z79.4 TYPE 2 DIABETES MELLITUS WITHOUT COMPLICATION, WITH LONG-TERM CURRENT USE OF INSULIN (MULTI): Primary | ICD-10-CM

## 2024-11-18 DIAGNOSIS — Z95.1 S/P CABG X 5: ICD-10-CM

## 2024-11-18 DIAGNOSIS — E66.811 CLASS 1 OBESITY DUE TO EXCESS CALORIES WITH SERIOUS COMORBIDITY AND BODY MASS INDEX (BMI) OF 32.0 TO 32.9 IN ADULT: ICD-10-CM

## 2024-11-18 PROCEDURE — 3061F NEG MICROALBUMINURIA REV: CPT | Performed by: INTERNAL MEDICINE

## 2024-11-18 PROCEDURE — 3051F HG A1C>EQUAL 7.0%<8.0%: CPT | Performed by: INTERNAL MEDICINE

## 2024-11-18 PROCEDURE — 1157F ADVNC CARE PLAN IN RCRD: CPT | Performed by: INTERNAL MEDICINE

## 2024-11-18 PROCEDURE — 3078F DIAST BP <80 MM HG: CPT | Performed by: INTERNAL MEDICINE

## 2024-11-18 PROCEDURE — 1036F TOBACCO NON-USER: CPT | Performed by: INTERNAL MEDICINE

## 2024-11-18 PROCEDURE — G2211 COMPLEX E/M VISIT ADD ON: HCPCS | Performed by: INTERNAL MEDICINE

## 2024-11-18 PROCEDURE — 99214 OFFICE O/P EST MOD 30 MIN: CPT | Performed by: INTERNAL MEDICINE

## 2024-11-18 PROCEDURE — 4010F ACE/ARB THERAPY RXD/TAKEN: CPT | Performed by: INTERNAL MEDICINE

## 2024-11-18 PROCEDURE — 3074F SYST BP LT 130 MM HG: CPT | Performed by: INTERNAL MEDICINE

## 2024-11-18 PROCEDURE — 1159F MED LIST DOCD IN RCRD: CPT | Performed by: INTERNAL MEDICINE

## 2024-11-18 PROCEDURE — 3048F LDL-C <100 MG/DL: CPT | Performed by: INTERNAL MEDICINE

## 2024-11-18 PROCEDURE — 1160F RVW MEDS BY RX/DR IN RCRD: CPT | Performed by: INTERNAL MEDICINE

## 2024-11-18 NOTE — PROGRESS NOTES
Subjective   Patient ID: Gabo Weiner is a 74 y.o. male who presents for Follow-up (3 month ).  HPI    Routine follow up       Feels well     Left foot rest of toes amputated (no toes now)  Healed well  Podiatry following     CAD / cholesterol stable on rx no side effects  S/P  CABG  cardio following / Dr Wylie     Hand shaking tremor is better on rx dose increased no side effects  neuro following     DM stable on rx no side effects  FBS  146 this am  HBA1C 7.4   8-24  neuropathy, microalbuminuria  check blood sugars per endo  endo following  / Dr Moncada  podiatry following / Dr Tavares         PVRODRIGUEZ  asymptomatic  Had left leg balloon angioplasty / vasc following ( Dr Neves)  Follow up vascular     hypertension stable on rx no side effects     vit D stable on rx no side effects     renal cancer and prostate cancer stable  urology released     diet / exercise rev'd     Colonoscopy ordered 12-24    Review of Systems   All other systems reviewed and are negative.      Objective   /50   Pulse 65   Temp 36.3 °C (97.3 °F)   Wt 104 kg (228 lb 6.4 oz)   SpO2 95%   BMI 32.77 kg/m²   Lab Results   Component Value Date    WBC 7.8 08/02/2024    HGB 16.6 08/02/2024    HCT 49.4 08/02/2024     08/02/2024    CHOL 139 08/02/2024    TRIG 164 (H) 08/02/2024    HDL 40.4 08/02/2024    ALT 59 (H) 08/02/2024    AST 34 08/02/2024     08/12/2024    K 4.6 08/12/2024     08/12/2024    CREATININE 0.78 08/12/2024    BUN 17 08/12/2024    CO2 33 (H) 08/12/2024    TSH 1.44 08/02/2024    PSA 0.16 11/09/2023    INR 1.0 07/10/2023    HGBA1C 7.4 (H) 08/02/2024    ALBUR 139.3 12/15/2017           Physical Exam  Vitals reviewed.   Constitutional:       Appearance: Normal appearance. He is obese.   HENT:      Head: Normocephalic and atraumatic.      Mouth/Throat:      Pharynx: No posterior oropharyngeal erythema.   Eyes:      General: No scleral icterus.     Conjunctiva/sclera: Conjunctivae normal.      Pupils: Pupils are  equal, round, and reactive to light.   Cardiovascular:      Rate and Rhythm: Normal rate and regular rhythm.      Heart sounds: Normal heart sounds.   Pulmonary:      Effort: No respiratory distress.      Breath sounds: No wheezing.   Abdominal:      General: Abdomen is flat. Bowel sounds are normal. There is no distension.      Palpations: Abdomen is soft. There is no mass.      Tenderness: There is no abdominal tenderness. There is no rebound.   Musculoskeletal:         General: Normal range of motion.      Cervical back: Normal range of motion and neck supple.   Skin:     General: Skin is warm and dry.   Neurological:      General: No focal deficit present.      Mental Status: He is alert and oriented to person, place, and time. Mental status is at baseline.   Psychiatric:         Mood and Affect: Mood normal.         Behavior: Behavior normal.         Thought Content: Thought content normal.         Judgment: Judgment normal.         Problem List Items Addressed This Visit             ICD-10-CM    Adenocarcinoma of prostate (Multi) C61    Diabetes mellitus (Multi) - Primary E11.9    Obesity E66.9    S/P CABG x 5 Z95.1    Tremor R25.1    Vitamin D deficiency E55.9    CAD (coronary artery disease) I25.10    Hypertensive heart disease with heart failure I11.0     Other Visit Diagnoses         Codes    Hypercholesteremia     E78.00    PVD (peripheral vascular disease) (CMS-[facility])     I73.9          Assessment/Plan         Feels well     Left foot rest of toes amputated (no toes now)  Healed well  Podiatry following / rec's rev'd     CAD / cholesterol stable on rx no side effects  S/P  CABG  cardio following / Dr Wylie     Hand shaking tremor is worse on rx dose increased no side effects  neuro following     DM stable on rx no side effects  FBS  146 this am / ice cream last nite  HBA1C 7.4   8-24  neuropathy, microalbuminuria  check blood sugars per endo  endo following  / Dr Moncada  podiatry following /   Chaitanya Torres / Dr Arenas 12-24         PVD  asymptomatic  Had left leg balloon angioplasty / vasc following ( Dr Neves)  Follow up vascular     hypertension stable on rx no side effects     vit D stable on rx no side effects     renal cancer and prostate cancer stable  urology released     diet / exercise rev'd     Colonoscopy ordered due 12-24    Prostate n/a  Colonoscopy 12-24 pending  CT chest lung cancer screening n/a  immunizations rev'd UTD  BMI 32.7    Follow up 3 months / medicare physical

## 2024-11-20 ENCOUNTER — APPOINTMENT (OUTPATIENT)
Dept: ENDOCRINOLOGY | Facility: CLINIC | Age: 74
End: 2024-11-20
Payer: MEDICARE

## 2024-11-20 VITALS
SYSTOLIC BLOOD PRESSURE: 112 MMHG | BODY MASS INDEX: 32.57 KG/M2 | DIASTOLIC BLOOD PRESSURE: 72 MMHG | HEART RATE: 69 BPM | WEIGHT: 227 LBS

## 2024-11-20 DIAGNOSIS — Z79.4 TYPE 2 DIABETES MELLITUS WITHOUT COMPLICATION, WITH LONG-TERM CURRENT USE OF INSULIN (MULTI): ICD-10-CM

## 2024-11-20 DIAGNOSIS — E11.9 TYPE 2 DIABETES MELLITUS WITHOUT COMPLICATION, WITH LONG-TERM CURRENT USE OF INSULIN (MULTI): ICD-10-CM

## 2024-11-20 LAB — POC HEMOGLOBIN A1C: 7 % (ref 4.2–6.5)

## 2024-11-20 PROCEDURE — 1157F ADVNC CARE PLAN IN RCRD: CPT | Performed by: INTERNAL MEDICINE

## 2024-11-20 PROCEDURE — 3074F SYST BP LT 130 MM HG: CPT | Performed by: INTERNAL MEDICINE

## 2024-11-20 PROCEDURE — 99214 OFFICE O/P EST MOD 30 MIN: CPT | Performed by: INTERNAL MEDICINE

## 2024-11-20 PROCEDURE — 3051F HG A1C>EQUAL 7.0%<8.0%: CPT | Performed by: INTERNAL MEDICINE

## 2024-11-20 PROCEDURE — 4010F ACE/ARB THERAPY RXD/TAKEN: CPT | Performed by: INTERNAL MEDICINE

## 2024-11-20 PROCEDURE — 3048F LDL-C <100 MG/DL: CPT | Performed by: INTERNAL MEDICINE

## 2024-11-20 PROCEDURE — 1036F TOBACCO NON-USER: CPT | Performed by: INTERNAL MEDICINE

## 2024-11-20 PROCEDURE — 1159F MED LIST DOCD IN RCRD: CPT | Performed by: INTERNAL MEDICINE

## 2024-11-20 PROCEDURE — 3061F NEG MICROALBUMINURIA REV: CPT | Performed by: INTERNAL MEDICINE

## 2024-11-20 PROCEDURE — G2211 COMPLEX E/M VISIT ADD ON: HCPCS | Performed by: INTERNAL MEDICINE

## 2024-11-20 PROCEDURE — 3078F DIAST BP <80 MM HG: CPT | Performed by: INTERNAL MEDICINE

## 2024-11-20 PROCEDURE — 1160F RVW MEDS BY RX/DR IN RCRD: CPT | Performed by: INTERNAL MEDICINE

## 2024-11-20 PROCEDURE — 83036 HEMOGLOBIN GLYCOSYLATED A1C: CPT | Performed by: INTERNAL MEDICINE

## 2024-11-20 ASSESSMENT — ENCOUNTER SYMPTOMS
NAUSEA: 0
ARTHRALGIAS: 0
SORE THROAT: 0
TREMORS: 1
CONSTIPATION: 0
SHORTNESS OF BREATH: 0
FREQUENCY: 0
APPETITE CHANGE: 0
WEAKNESS: 1
ABDOMINAL PAIN: 0
NERVOUS/ANXIOUS: 0
HEADACHES: 0
POLYDIPSIA: 0
FEVER: 0
APNEA: 1
VOMITING: 0
COUGH: 0
DIARRHEA: 1
BACK PAIN: 1

## 2024-11-20 NOTE — PATIENT INSTRUCTIONS
A1c 7%    RECOMMENDATIONS  Continue current program    Follow up 3-4 months  Review Chelsie at all appointments

## 2024-11-20 NOTE — LETTER
November 20, 2024     Libby Sullivan MD  [address]  [facility] [address]    Patient: Gabo Weiner   YOB: 1950   Date of Visit: 11/20/2024       Dear Dr. Libby Sulliavn MD:    Thank you for referring Gabo Weiner to me for evaluation. Below are my notes for this consultation.  If you have questions, please do not hesitate to call me. I look forward to following your patient along with you.       Sincerely,     Rogelio Moncada MD      CC: No Recipients  ______________________________________________________________________________________    History Of Present Illness  Gabo Weiner is a 74 y.o. male     Duration of type 2 diabetes mellitus:  20 years  Complicated by neuropathy, albuminuria, CAD, amputations      Novolog   Breakfast 6 units  Lunch 8 units  Dinner 10 units  plus corrective scale, add 1 for every 50 over glucose 101 mg/dl.     Tresiba 32 units at bedtime. Patient assistance, NovoNordisk     Metformin 1000 mg BID  Jardiance 25 mg/day, prescription per Dr. Wylie.      FreeStyle Chelsie 2  Patient is testing glucose 288 times daily  Records reviewed, on file     Last eye exam:  December 2023    Podiatry:  Dr. DIANN Tavares, every 10 weeks      Past Medical History  He has a past medical history of Hyperlipidemia, unspecified (11/10/2022), Malignant neoplasm of prostate (Multi) (09/29/2021), Personal history of malignant neoplasm, unspecified (07/09/2014), Personal history of other diseases of the nervous system and sense organs (04/01/2020), Personal history of other diseases of the respiratory system (01/07/2016), Personal history of other malignant neoplasm of kidney (11/10/2022), and Unspecified hearing loss, unspecified ear (07/09/2014).    Surgical History  He has a past surgical history that includes Other surgical history (06/10/2013); Tonsillectomy (06/10/2013); Other surgical history (11/10/2022); Colonoscopy (12/31/2019); Colonoscopy (12/31/2019); Colonoscopy  "(12/31/2019); Colonoscopy (12/31/2019); Other surgical history (04/17/2014); Other surgical history (04/17/2014); and Other surgical history (04/17/2014).     Social History  He reports that he has never smoked. He has never been exposed to tobacco smoke. He has never used smokeless tobacco. He reports that he does not drink alcohol and does not use drugs.    Family History  No family history on file.    Medications  Current Outpatient Medications   Medication Instructions   • aspirin 81 mg EC tablet Every 24 hours   • atorvastatin (Lipitor) 80 mg tablet Take by mouth.   • BD Juhi 2nd Gen Pen Needle 32 gauge x 5/32\" needle 1 each 5 times a day.   • cetirizine (ZYRTEC) 10 mg, Daily   • cholecalciferol (Vitamin D-3) 5,000 Units tablet Take by mouth.   • clopidogrel (Plavix) 75 mg tablet 1 tablet, Daily   • docusate sodium (Colace) 100 mg capsule Take by mouth.   • empagliflozin (Jardiance) 25 mg 1 tablet, Daily   • fluoride, sodium, (Prevident 5000 Booster) 1.1 % dental paste USE AS DIRECTED   • FreeStyle Chelsie 14 Day Sensor kit FOR CONTINUOUS GLUCOSE MONITORING   • furosemide (Lasix) 40 mg tablet 1 tablet, Daily   • insulin aspart (NovoLOG Flexpen U-100 Insulin) 100 unit/mL (3 mL) pen INJECT 6 TO 10 UNITS THREE TIMES DAILY BEFORE MEALS   • insulin degludec (TRESIBA FLEXTOUCH U-100) 32 Units, subcutaneous, Nightly   • lisinopril 20 mg tablet Take by mouth.   • metFORMIN (GLUCOPHAGE) 1,000 mg, oral, 2 times daily   • metoprolol tartrate (LOPRESSOR) 100 mg, 2 times daily   • metoprolol tartrate (LOPRESSOR) 25 mg, 2 times daily   • multivitamin tablet Take by mouth.   • primidone (MYSOLINE) 150 mg, oral, 2 times daily       Allergies  Patient has no known allergies.    Review of Systems   Constitutional:  Negative for appetite change and fever.   HENT:  Negative for sore throat.         Denies dry mouth   Eyes:  Negative for visual disturbance.   Respiratory:  Positive for apnea (sleep apnea). Negative for cough and " shortness of breath.    Cardiovascular:  Negative for chest pain.   Gastrointestinal:  Positive for diarrhea. Negative for abdominal pain, constipation, nausea and vomiting.   Endocrine: Negative for polydipsia and polyuria.   Genitourinary:  Negative for frequency.   Musculoskeletal:  Positive for back pain. Negative for arthralgias.   Skin:  Negative for rash.   Neurological:  Positive for tremors and weakness. Negative for headaches.   Psychiatric/Behavioral:  The patient is not nervous/anxious.          Last Recorded Vitals  Blood pressure 112/72, pulse 69, weight 103 kg (227 lb).    Physical Exam  Constitutional:       General: He is not in acute distress.  HENT:      Head: Normocephalic.      Mouth/Throat:      Mouth: Mucous membranes are moist.   Eyes:      Extraocular Movements: Extraocular movements intact.   Neck:      Thyroid: No thyroid mass or thyromegaly.   Cardiovascular:      Pulses:           Radial pulses are 2+ on the right side and 2+ on the left side.   Musculoskeletal:      Right lower leg: No edema.      Left lower leg: No edema.   Lymphadenopathy:      Cervical: No cervical adenopathy.   Neurological:      Mental Status: He is alert.      Motor: Tremor (Left > Right hand) present.   Psychiatric:         Mood and Affect: Affect normal.          Relevant Results  Glucose   Date Value   08/12/2024 156 mg/dL (H)   08/02/2024 148 mg/dL (H)   07/31/2023 160 mg/dL (H)   07/12/2023 110 MG/DL (H)   07/10/2023 144 MG/DL (H)     POC HEMOGLOBIN A1c (%)   Date Value   11/20/2024 7.0 (A)     Hemoglobin A1C (%)   Date Value   08/02/2024 7.4 (H)   07/31/2023 7.3 (A)   07/10/2023 7.4 (H)   01/25/2023 7.4 (H)     Bicarbonate   Date Value   08/12/2024 33 mmol/L (H)   08/02/2024 31 mmol/L   07/31/2023 31 mmol/L   07/12/2023 25 MMOL/L   07/10/2023 25 MMOL/L     Urea Nitrogen   Date Value   08/12/2024 17 mg/dL   08/02/2024 17 mg/dL   07/31/2023 18 mg/dL   07/12/2023 14 MG/DL   07/10/2023 17 MG/DL     Creatinine    Date Value   08/12/2024 0.78 mg/dL   08/02/2024 0.80 mg/dL   07/31/2023 0.76 mg/dL   07/12/2023 0.8 MG/DL   07/10/2023 0.9 MG/DL     Lab Results   Component Value Date    CHOL 139 08/02/2024    CHOL 138 07/31/2023    CHOL 122 08/08/2022     Lab Results   Component Value Date    HDL 40.4 08/02/2024    HDL 47.2 07/31/2023    HDL 36.0 (A) 08/08/2022     Lab Results   Component Value Date    LDLCALC 66 08/02/2024     Lab Results   Component Value Date    TRIG 164 (H) 08/02/2024    TRIG 147 07/31/2023    TRIG 191 (H) 08/08/2022     Lab Results   Component Value Date    TSH 1.44 08/02/2024     Lab Results   Component Value Date    ALBUR 139.3 12/15/2017    [id number] 279 (A) 12/15/2017          IMPRESSION  TYPE 2 DIABETES MELLITUS   LONG TERM CURRENT INSULIN USE   Rapid A1c 7%  A1c improved  Glucose well controlled  Patient is adhering to and benefitting from continuous glucose monitoring.       RECOMMENDATIONS  Continue current program    Follow up 3-4 months  Review Chelsie at all appointments

## 2024-11-20 NOTE — PROGRESS NOTES
History Of Present Illness  Gabo Weiner is a 74 y.o. male     Duration of type 2 diabetes mellitus:  20 years  Complicated by neuropathy, albuminuria, CAD, amputations      Novolog   Breakfast 6 units  Lunch 8 units  Dinner 10 units  plus corrective scale, add 1 for every 50 over glucose 101 mg/dl.     Tresiba 32 units at bedtime. Patient assistance, NovoNordisk     Metformin 1000 mg BID  Jardiance 25 mg/day, prescription per Dr. Wylie.      FreeStyle Chelsie 2  Patient is testing glucose 288 times daily  Records reviewed, on file     Last eye exam:  December 2023    Podiatry:  Dr. DIANN Tavares, every 10 weeks      Past Medical History  He has a past medical history of Hyperlipidemia, unspecified (11/10/2022), Malignant neoplasm of prostate (Multi) (09/29/2021), Personal history of malignant neoplasm, unspecified (07/09/2014), Personal history of other diseases of the nervous system and sense organs (04/01/2020), Personal history of other diseases of the respiratory system (01/07/2016), Personal history of other malignant neoplasm of kidney (11/10/2022), and Unspecified hearing loss, unspecified ear (07/09/2014).    Surgical History  He has a past surgical history that includes Other surgical history (06/10/2013); Tonsillectomy (06/10/2013); Other surgical history (11/10/2022); Colonoscopy (12/31/2019); Colonoscopy (12/31/2019); Colonoscopy (12/31/2019); Colonoscopy (12/31/2019); Other surgical history (04/17/2014); Other surgical history (04/17/2014); and Other surgical history (04/17/2014).     Social History  He reports that he has never smoked. He has never been exposed to tobacco smoke. He has never used smokeless tobacco. He reports that he does not drink alcohol and does not use drugs.    Family History  No family history on file.    Medications  Current Outpatient Medications   Medication Instructions    aspirin 81 mg EC tablet Every 24 hours    atorvastatin (Lipitor) 80 mg tablet Take by mouth.    BD Juhi 2nd  "Gen Pen Needle 32 gauge x 5/32\" needle 1 each 5 times a day.    cetirizine (ZYRTEC) 10 mg, Daily    cholecalciferol (Vitamin D-3) 5,000 Units tablet Take by mouth.    clopidogrel (Plavix) 75 mg tablet 1 tablet, Daily    docusate sodium (Colace) 100 mg capsule Take by mouth.    empagliflozin (Jardiance) 25 mg 1 tablet, Daily    fluoride, sodium, (Prevident 5000 Booster) 1.1 % dental paste USE AS DIRECTED    FreeStyle Chelsie 14 Day Sensor kit FOR CONTINUOUS GLUCOSE MONITORING    furosemide (Lasix) 40 mg tablet 1 tablet, Daily    insulin aspart (NovoLOG Flexpen U-100 Insulin) 100 unit/mL (3 mL) pen INJECT 6 TO 10 UNITS THREE TIMES DAILY BEFORE MEALS    insulin degludec (TRESIBA FLEXTOUCH U-100) 32 Units, subcutaneous, Nightly    lisinopril 20 mg tablet Take by mouth.    metFORMIN (GLUCOPHAGE) 1,000 mg, oral, 2 times daily    metoprolol tartrate (LOPRESSOR) 100 mg, 2 times daily    metoprolol tartrate (LOPRESSOR) 25 mg, 2 times daily    multivitamin tablet Take by mouth.    primidone (MYSOLINE) 150 mg, oral, 2 times daily       Allergies  Patient has no known allergies.    Review of Systems   Constitutional:  Negative for appetite change and fever.   HENT:  Negative for sore throat.         Denies dry mouth   Eyes:  Negative for visual disturbance.   Respiratory:  Positive for apnea (sleep apnea). Negative for cough and shortness of breath.    Cardiovascular:  Negative for chest pain.   Gastrointestinal:  Positive for diarrhea. Negative for abdominal pain, constipation, nausea and vomiting.   Endocrine: Negative for polydipsia and polyuria.   Genitourinary:  Negative for frequency.   Musculoskeletal:  Positive for back pain. Negative for arthralgias.   Skin:  Negative for rash.   Neurological:  Positive for tremors and weakness. Negative for headaches.   Psychiatric/Behavioral:  The patient is not nervous/anxious.          Last Recorded Vitals  Blood pressure 112/72, pulse 69, weight 103 kg (227 lb).    Physical " Exam  Constitutional:       General: He is not in acute distress.  HENT:      Head: Normocephalic.      Mouth/Throat:      Mouth: Mucous membranes are moist.   Eyes:      Extraocular Movements: Extraocular movements intact.   Neck:      Thyroid: No thyroid mass or thyromegaly.   Cardiovascular:      Pulses:           Radial pulses are 2+ on the right side and 2+ on the left side.   Musculoskeletal:      Right lower leg: No edema.      Left lower leg: No edema.   Lymphadenopathy:      Cervical: No cervical adenopathy.   Neurological:      Mental Status: He is alert.      Motor: Tremor (Left > Right hand) present.   Psychiatric:         Mood and Affect: Affect normal.          Relevant Results  Glucose   Date Value   08/12/2024 156 mg/dL (H)   08/02/2024 148 mg/dL (H)   07/31/2023 160 mg/dL (H)   07/12/2023 110 MG/DL (H)   07/10/2023 144 MG/DL (H)     POC HEMOGLOBIN A1c (%)   Date Value   11/20/2024 7.0 (A)     Hemoglobin A1C (%)   Date Value   08/02/2024 7.4 (H)   07/31/2023 7.3 (A)   07/10/2023 7.4 (H)   01/25/2023 7.4 (H)     Bicarbonate   Date Value   08/12/2024 33 mmol/L (H)   08/02/2024 31 mmol/L   07/31/2023 31 mmol/L   07/12/2023 25 MMOL/L   07/10/2023 25 MMOL/L     Urea Nitrogen   Date Value   08/12/2024 17 mg/dL   08/02/2024 17 mg/dL   07/31/2023 18 mg/dL   07/12/2023 14 MG/DL   07/10/2023 17 MG/DL     Creatinine   Date Value   08/12/2024 0.78 mg/dL   08/02/2024 0.80 mg/dL   07/31/2023 0.76 mg/dL   07/12/2023 0.8 MG/DL   07/10/2023 0.9 MG/DL     Lab Results   Component Value Date    CHOL 139 08/02/2024    CHOL 138 07/31/2023    CHOL 122 08/08/2022     Lab Results   Component Value Date    HDL 40.4 08/02/2024    HDL 47.2 07/31/2023    HDL 36.0 (A) 08/08/2022     Lab Results   Component Value Date    LDLCALC 66 08/02/2024     Lab Results   Component Value Date    TRIG 164 (H) 08/02/2024    TRIG 147 07/31/2023    TRIG 191 (H) 08/08/2022     Lab Results   Component Value Date    TSH 1.44 08/02/2024     Lab  Results   Component Value Date    ALBUR 139.3 12/15/2017    [id number] 279 (A) 12/15/2017          IMPRESSION  TYPE 2 DIABETES MELLITUS   LONG TERM CURRENT INSULIN USE   Rapid A1c 7%  A1c improved  Glucose well controlled  Patient is adhering to and benefitting from continuous glucose monitoring.       RECOMMENDATIONS  Continue current program    Follow up 3-4 months  Review Chelsie at all appointments

## 2024-12-10 ENCOUNTER — TELEPHONE (OUTPATIENT)
Dept: PREADMISSION TESTING | Facility: HOSPITAL | Age: 74
End: 2024-12-10
Payer: MEDICARE

## 2024-12-10 NOTE — TELEPHONE ENCOUNTER
SURGERY PRE-OPERATIVE INSTRUCTIONS    Patient verbalized understanding of bowel prep.  Patient directed to contact Dr. Jacobo re: Tresiba dose on 12/11 pm.  Pt to also ask about Lispro dosage.  Patient knows no Lispro insulin or Lisinopril on the mornng of the procedure.  Patient told to stop Metformin, Jardiance, and Plavix on 12/11/2024.  Last dose 12/10/2024.  This is per Dr. Hammond.  Pt to continue 81mg Aspirin.  Pt told to continue his lasix as he takes it for his heart.    *You will receive a phone call the day before your procedure  after 2pm, (or the Friday before your surgery if scheduled on a Monday.) Generally the hospital will be calling you with this information after that time.    *You are not to eat after midnight the night before the surgery. You may have up to 13 ounces of clear liquids up until 2 hours prior to arriving to the hospital. The exception is with medications you were instructed to take day of surgery.    *You may take tylenol for pain/discomfort as needed.     *Stop taking all aspirin products, ibuprofen (motrin/advil), naproxen (aleve/naprosyn) for one week prior to surgery.    *Stop taking all vitamins and supplements one week prior to surgery.     *You should not have alcoholic beverages for 24 hours before surgery.     *You should not smoke 24 hours prior to surgery.     *To help prevent surgical infections bathe/shower with Dial soap the evening before surgery.    *You can wear deodorant but no lotion, powder, or perfume/cologne. You should remove all make-up and nail polish at home.    *If you wear glasses, please bring a case for the glasses with you.    *You will be asked to remove dentures and contacts.     *Please leave all valuables at home.    *You should wear loose, comfortable clothing that will accommodate bandages and/or casts.    *You should notify your doctor of any change in your condition (fever, cold, rash, etc). Surgery may need to be re-scheduled until a time you  are in better health.    *A responsible adult is required to accompany you to and from the hospital if you are receiving anesthesia or a sedative. Patients are not permitted to drive for 24 hours after anesthesia.     *You can use the [employer] parking if you wish.     *If you have any further questions please call [phone].

## 2024-12-11 ENCOUNTER — ANESTHESIA EVENT (OUTPATIENT)
Dept: OPERATING ROOM | Facility: HOSPITAL | Age: 74
End: 2024-12-11
Payer: MEDICARE

## 2024-12-12 ENCOUNTER — ANESTHESIA (OUTPATIENT)
Dept: OPERATING ROOM | Facility: HOSPITAL | Age: 74
End: 2024-12-12
Payer: MEDICARE

## 2024-12-12 ENCOUNTER — HOSPITAL ENCOUNTER (OUTPATIENT)
Dept: OPERATING ROOM | Facility: HOSPITAL | Age: 74
Setting detail: OUTPATIENT SURGERY
Discharge: HOME | End: 2024-12-12
Payer: MEDICARE

## 2024-12-12 VITALS
OXYGEN SATURATION: 92 % | RESPIRATION RATE: 16 BRPM | HEART RATE: 66 BPM | DIASTOLIC BLOOD PRESSURE: 60 MMHG | WEIGHT: 229.28 LBS | SYSTOLIC BLOOD PRESSURE: 136 MMHG | HEIGHT: 71 IN | TEMPERATURE: 98.1 F | BODY MASS INDEX: 32.1 KG/M2

## 2024-12-12 DIAGNOSIS — D12.6 ADENOMA OF COLON: ICD-10-CM

## 2024-12-12 LAB — GLUCOSE BLD MANUAL STRIP-MCNC: 147 MG/DL (ref 74–99)

## 2024-12-12 PROCEDURE — 45385 COLONOSCOPY W/LESION REMOVAL: CPT | Performed by: SURGERY

## 2024-12-12 PROCEDURE — 3600000007 HC OR TIME - EACH INCREMENTAL 1 MINUTE - PROCEDURE LEVEL TWO: Performed by: ANESTHESIOLOGY

## 2024-12-12 PROCEDURE — 2500000004 HC RX 250 GENERAL PHARMACY W/ HCPCS (ALT 636 FOR OP/ED): Performed by: NURSE ANESTHETIST, CERTIFIED REGISTERED

## 2024-12-12 PROCEDURE — 3700000002 HC GENERAL ANESTHESIA TIME - EACH INCREMENTAL 1 MINUTE: Performed by: ANESTHESIOLOGY

## 2024-12-12 PROCEDURE — 3700000001 HC GENERAL ANESTHESIA TIME - INITIAL BASE CHARGE: Performed by: ANESTHESIOLOGY

## 2024-12-12 PROCEDURE — 7100000009 HC PHASE TWO TIME - INITIAL BASE CHARGE: Performed by: ANESTHESIOLOGY

## 2024-12-12 PROCEDURE — 7100000010 HC PHASE TWO TIME - EACH INCREMENTAL 1 MINUTE: Performed by: ANESTHESIOLOGY

## 2024-12-12 PROCEDURE — 3600000002 HC OR TIME - INITIAL BASE CHARGE - PROCEDURE LEVEL TWO: Performed by: ANESTHESIOLOGY

## 2024-12-12 PROCEDURE — 82947 ASSAY GLUCOSE BLOOD QUANT: CPT

## 2024-12-12 RX ORDER — PROPOFOL 10 MG/ML
INJECTION, EMULSION INTRAVENOUS CONTINUOUS PRN
Status: DISCONTINUED | OUTPATIENT
Start: 2024-12-12 | End: 2024-12-12

## 2024-12-12 RX ORDER — LIDOCAINE HCL/PF 100 MG/5ML
SYRINGE (ML) INTRAVENOUS AS NEEDED
Status: DISCONTINUED | OUTPATIENT
Start: 2024-12-12 | End: 2024-12-12

## 2024-12-12 RX ORDER — ONDANSETRON HYDROCHLORIDE 2 MG/ML
4 INJECTION, SOLUTION INTRAVENOUS ONCE AS NEEDED
Status: DISCONTINUED | OUTPATIENT
Start: 2024-12-12 | End: 2024-12-13 | Stop reason: HOSPADM

## 2024-12-12 RX ORDER — DROPERIDOL 2.5 MG/ML
0.62 INJECTION, SOLUTION INTRAMUSCULAR; INTRAVENOUS ONCE AS NEEDED
Status: DISCONTINUED | OUTPATIENT
Start: 2024-12-12 | End: 2024-12-13 | Stop reason: HOSPADM

## 2024-12-12 SDOH — HEALTH STABILITY: MENTAL HEALTH: CURRENT SMOKER: 0

## 2024-12-12 ASSESSMENT — PAIN SCALES - GENERAL
PAIN_LEVEL: 2
PAINLEVEL_OUTOF10: 0 - NO PAIN

## 2024-12-12 ASSESSMENT — PAIN - FUNCTIONAL ASSESSMENT
PAIN_FUNCTIONAL_ASSESSMENT: 0-10
PAIN_FUNCTIONAL_ASSESSMENT: 0-10

## 2024-12-12 ASSESSMENT — COLUMBIA-SUICIDE SEVERITY RATING SCALE - C-SSRS
1. IN THE PAST MONTH, HAVE YOU WISHED YOU WERE DEAD OR WISHED YOU COULD GO TO SLEEP AND NOT WAKE UP?: NO
2. HAVE YOU ACTUALLY HAD ANY THOUGHTS OF KILLING YOURSELF?: NO
6. HAVE YOU EVER DONE ANYTHING, STARTED TO DO ANYTHING, OR PREPARED TO DO ANYTHING TO END YOUR LIFE?: NO

## 2024-12-12 NOTE — Clinical Note
Pt retained upper denture and lower partials during procedure. Nurse has L hearing aid and glasses. Items to be returned in pacu

## 2024-12-12 NOTE — ANESTHESIA PREPROCEDURE EVALUATION
Patient: Gabo Weiner    Procedure Information       Anesthesia Start Date/Time: 12/12/24 1205    Scheduled providers: Carlos Hammond MD; Philip Koehler MD    Procedure: COLONOSCOPY    Location: [facility] OR            Relevant Problems   Cardiac   (+) Benign essential hypertension   (+) CAD (coronary artery disease)   (+) Hyperlipidemia   (+) Hyperlipidemia, unspecified   (+) Hypertension   (+) Peripheral vascular disease (CMS-HCC)   (+) Type 2 diabetes mellitus with peripheral angiopathy      GI   (+) Gastro-esophageal reflux disease without esophagitis      /Renal   (+) Adenocarcinoma of prostate (Multi)   (+) Renal cell carcinoma (Multi)      Endocrine   (+) Diabetes mellitus, type 2 (Multi)   (+) Obesity   (+) Type 2 diabetes mellitus with diabetic neuropathy, without long-term current use of insulin   (+) Type 2 diabetes mellitus without complications (Multi)      Hematology   (+) Anemia due to acute blood loss      HEENT   (+) Acute sinusitis   (+) Hearing loss      ID   (+) Infection of toe   (+) Methicillin resistant Staphylococcus aureus infection   (+) Onychomycosis       Clinical information reviewed:   Tobacco  Allergies  Meds   Med Hx  Surg Hx   Fam Hx  Soc Hx        NPO Detail:  NPO/Void Status  Date of Last Liquid: 12/12/24  Time of Last Liquid: 0900  Date of Last Solid: 12/11/24  Last Intake Type: Clear fluids         Physical Exam    Airway  Mallampati: III  TM distance: >3 FB     Cardiovascular - normal exam     Dental    Pulmonary - normal exam     Abdominal   (+) obese         Anesthesia Plan    History of general anesthesia?: yes  History of complications of general anesthesia?: no    ASA 3     MAC     The patient is not a current smoker.    intravenous induction   Anesthetic plan and risks discussed with patient.    Plan discussed with CRNA and attending.

## 2024-12-12 NOTE — DISCHARGE INSTRUCTIONS
Patient Instructions after a Colonoscopy      The anesthetics, sedatives or narcotics which were given to you today will be acting in your body for the next 24 hours, so you might feel a little sleepy or groggy.  This feeling should slowly wear off. Carefully read and follow the instructions.     You received sedation today:  - Do not drive or operate any machinery or power tools of any kind.   - No alcoholic beverages today, not even beer or wine.  - Do not make any important decisions or sign any legal documents.  - No over the counter medications that contain alcohol or that may cause drowsiness.  - Do not make any important decisions or sign any legal documents.  - Make sure you have someone with you for first 24 hours.    While it is common to experience mild to moderate abdominal distention, gas, or belching after your procedure, if any of these symptoms occur following discharge from the GI Lab or within one week of having your procedure, call the [facility] to be advised whether a visit to your nearest Urgent Care or Emergency Department is indicated.  Take this paper with you if you go.     - If you develop an allergic reaction to the medications that were given during your procedure such as difficulty breathing, rash, hives, severe nausea, vomiting or lightheadedness.  - If you experience chest pain, shortness of breath, severe abdominal pain, fevers and chills.  -If you develop signs and symptoms of bleeding such as blood in your spit, if your stools turn black, tarry, or bloody  - If you have not urinated within 8 hours following your procedure.  - If your IV site becomes painful, red, inflamed, or looks infected.    If you received a biopsy/polypectomy/sphincterotomy the following instructions apply below:    __ Do not use Aspirin containing products, non-steroidal medications or anti-coagulants for one week following your procedure. (Examples of these types of medications are: Advil,  Arthrotec, Aleve, Coumadin, Ecotrin, Heparin, Ibuprofen, Indocin, Motrin, Naprosyn, Nuprin, Plavix, Vioxx, and Voltarin, or their generic forms.  This list is not all-inclusive.  Check with your physician or pharmacist before resuming medications.)   __ Eat a soft diet today.  Avoid foods that are poorly digested for the next 24 hours.  These foods would include: nuts, beans, lettuce, red meats, and fried foods. Start with liquids and advance your diet as tolerated, gradually work up to eating solids.   __ Do not have a Barium Study or Enema for one week.    Your physician recommends the additional following instructions:    -You have a contact number available for emergencies. The signs and symptoms of potential delayed complications were discussed with you. You may return to normal activities tomorrow.  -Resume your previous diet.  -Continue your present medications.   -We are waiting for your pathology results.  -Your physician has recommended a repeat colonoscopy (date to be determined after pending pathology results are reviewed) for surveillance based on pathology results.  -The findings and recommendations have been discussed with you.  -The findings and recommendations were discussed with your family.  - Please see Medication Reconciliation Form for new medication/medications prescribed.       If you experience any problems or have any questions following discharge from the GI Lab, please call:        Nurse Signature                                                                        Date___________________                                                                            Patient/Responsible Party Signature                                        Date___________________

## 2024-12-12 NOTE — ANESTHESIA POSTPROCEDURE EVALUATION
Patient: Gabo Weiner    Procedure Summary       Date: 12/12/24 Room / Location: [facility] OR    Anesthesia Start: 1205 Anesthesia Stop: 1241    Procedure: COLONOSCOPY Diagnosis: Adenoma of colon    Scheduled Providers: Carlos Hammond MD; Philip Koehler MD Responsible Provider: Philip Koehler MD    Anesthesia Type: MAC ASA Status: 3            Anesthesia Type: MAC    Vitals Value Taken Time   /60 12/12/24 1253   Temp 36.7 °C (98.1 °F) 12/12/24 1238   Pulse 66 12/12/24 1253   Resp 16 12/12/24 1253   SpO2 92 % 12/12/24 1253       Anesthesia Post Evaluation    Patient location during evaluation: PACU  Patient participation: complete - patient participated  Level of consciousness: awake  Pain score: 2  Pain management: adequate  Multimodal analgesia pain management approach  Airway patency: patent  Two or more strategies used to mitigate risk of obstructive sleep apnea  Cardiovascular status: acceptable  Respiratory status: acceptable  Hydration status: acceptable  Postoperative Nausea and Vomiting: none    No notable events documented.

## 2024-12-12 NOTE — H&P
"General Surgery History and Physical    Referring Provider:  MD Nate Valladares Josephine R, MD     Chief Complaint:  Colonoscopy    History of Present Illness:  This is a 74 y.o. male who presents for a colonoscopy.    Past Medical History:  Past Medical History:   Diagnosis Date    Coronary artery disease     Essential tremor     GERD (gastroesophageal reflux disease)     Hyperlipidemia, unspecified 11/10/2022    Hyperlipidemia    Malignant neoplasm of prostate (Multi) 09/29/2021    Adenocarcinoma of prostate-radiation tx    Neuropathy     Personal history of malignant neoplasm, unspecified 07/09/2014    Personal history of malignant neoplasm    Personal history of other diseases of the nervous system and sense organs 04/01/2020    History of impacted cerumen    Personal history of other diseases of the respiratory system 01/07/2016    History of acute sinusitis    Personal history of other malignant neoplasm of kidney 11/10/2022    History of renal cell carcinoma    Sleep apnea     BiPAP    Type 2 diabetes mellitus     Unspecified hearing loss, unspecified ear 07/09/2014    Problems with hearing        Past Surgical History:  Past Surgical History:   Procedure Laterality Date    COLONOSCOPY  12/31/2019    Complete Colonoscopy    COLONOSCOPY  12/31/2019    Complete Colonoscopy    COLONOSCOPY  12/31/2019    Complete Colonoscopy    COLONOSCOPY  12/31/2019    Complete Colonoscopy    CORONARY ARTERY BYPASS GRAFT      x5    OTHER SURGICAL HISTORY  06/10/2013    Needle Biopsy Of Prostate    OTHER SURGICAL HISTORY  04/17/2014    History Of Prior Surgery    OTHER SURGICAL HISTORY  04/17/2014    Nephrectomy        Medications:  Current Outpatient Medications   Medication Instructions    aspirin 81 mg EC tablet Every 24 hours    atorvastatin (Lipitor) 80 mg tablet Take by mouth.    BD Juhi 2nd Gen Pen Needle 32 gauge x 5/32\" needle 1 each 5 times a day.    cetirizine (ZYRTEC) 10 mg, Daily    cholecalciferol " (Vitamin D-3) 5,000 Units tablet Take by mouth.    clopidogrel (Plavix) 75 mg tablet 1 tablet, Daily    empagliflozin (Jardiance) 25 mg 1 tablet, Daily    fluoride, sodium, (Prevident 5000 Booster) 1.1 % dental paste USE AS DIRECTED    FreeStyle Chelsie 14 Day Sensor kit FOR CONTINUOUS GLUCOSE MONITORING    furosemide (Lasix) 40 mg tablet 1 tablet, Daily    insulin aspart (NovoLOG Flexpen U-100 Insulin) 100 unit/mL (3 mL) pen INJECT 6 TO 10 UNITS THREE TIMES DAILY BEFORE MEALS    insulin degludec (TRESIBA FLEXTOUCH U-100) 32 Units, subcutaneous, Nightly    lisinopril 20 mg tablet Take by mouth.    metFORMIN (GLUCOPHAGE) 1,000 mg, oral, 2 times daily    metoprolol tartrate (LOPRESSOR) 100 mg, 2 times daily    metoprolol tartrate (LOPRESSOR) 25 mg, 2 times daily    multivitamin tablet Take by mouth.    primidone (MYSOLINE) 150 mg, oral, 2 times daily        Allergies:  No Known Allergies     Family History:  No family history on file.     Social History:  Social History     Socioeconomic History    Marital status: Single   Tobacco Use    Smoking status: Never     Passive exposure: Never    Smokeless tobacco: Never    Tobacco comments:     Used smokeless tobacco   Vaping Use    Vaping status: Never Used   Substance and Sexual Activity    Alcohol use: Never    Drug use: Never        Review of Systems:  A complete 12 point review of systems was performed and is negative except as noted in the history of present illness.      Vital Signs:  Vitals:    12/12/24 1113   BP: 150/73   Pulse: 70   Resp: 17   Temp: 36.2 °C (97.2 °F)   SpO2: 94%          Physical Exam:  General: No acute distress. Sitting up in bed.   Neuro: Alert and oriented ×3. Follows commands.  Head: Atraumatic  Eyes: Pupils equal reactive to light. Extraocular motions intact.  Ears: Hears normal speaking voice.  Mouth, Nose, Throat: Mucous membranes moist.  Normal dentition.  Neck: Supple. No appreciable masses.  Chest: No crepitus.    Heart: Regular rate and  "rhythm.  Lung: Clear to auscultation bilaterally.  Vascular: No carotid bruits.  Palpable radial pulses bilaterally.  Abdomen: Soft. Nondistended. Nontender.    Musculoskeletal: Moves all extremities.  Normal range of motion.  Lymphatic: No palpable lymph nodes.  Skin: No rashes or lesions.  Psychological: Normal affect      Laboratory Values:  CBC: No results found for: \"WBC\", \"RBC\", \"HGB\", \"HCT\", \"PLT\"    RFP: No results found for: \"GLUF\", \"NA\", \"K\", \"CL\", \"CO2\", \"BUN\", \"CREATININE\", \"CALCIUM\", \"MG\", \"PHOS\"     LFTs: No results found for: \"PROT\", \"ALBUMIN\", \"BILITOT\", \"BILIDIR\", \"ALKPHOS\", \"AST\", \"ALT\"         Assessment:  This is a 74 y.o. male who presents for a colonoscopy.      Plan:  -- Colonoscopy.      Jaylon Hammond MD  General Surgery  Office: [phone]  Fax:     [phone]  11:43 AM   12/12/24     "

## 2024-12-19 LAB
LABORATORY COMMENT REPORT: NORMAL
PATH REPORT.FINAL DX SPEC: NORMAL
PATH REPORT.GROSS SPEC: NORMAL
PATH REPORT.RELEVANT HX SPEC: NORMAL
PATH REPORT.TOTAL CANCER: NORMAL

## 2025-02-19 ENCOUNTER — APPOINTMENT (OUTPATIENT)
Dept: PRIMARY CARE | Facility: CLINIC | Age: 75
End: 2025-02-19
Payer: MEDICARE

## 2025-02-19 VITALS
HEART RATE: 64 BPM | OXYGEN SATURATION: 96 % | BODY MASS INDEX: 32.31 KG/M2 | WEIGHT: 230.8 LBS | HEIGHT: 71 IN | TEMPERATURE: 97.3 F | SYSTOLIC BLOOD PRESSURE: 132 MMHG | DIASTOLIC BLOOD PRESSURE: 68 MMHG

## 2025-02-19 DIAGNOSIS — I50.9 HEART FAILURE, UNSPECIFIED HF CHRONICITY, UNSPECIFIED HEART FAILURE TYPE: ICD-10-CM

## 2025-02-19 DIAGNOSIS — C64.9 RENAL CELL CARCINOMA, UNSPECIFIED LATERALITY (MULTI): ICD-10-CM

## 2025-02-19 DIAGNOSIS — E78.00 HYPERCHOLESTEREMIA: ICD-10-CM

## 2025-02-19 DIAGNOSIS — Z79.4 TYPE 2 DIABETES MELLITUS WITHOUT COMPLICATION, WITH LONG-TERM CURRENT USE OF INSULIN (MULTI): ICD-10-CM

## 2025-02-19 DIAGNOSIS — G25.0 ESSENTIAL TREMOR: ICD-10-CM

## 2025-02-19 DIAGNOSIS — Z00.00 ENCOUNTER FOR SUBSEQUENT ANNUAL WELLNESS VISIT (AWV) IN MEDICARE PATIENT: ICD-10-CM

## 2025-02-19 DIAGNOSIS — L97.526 NON-PRESSURE CHRONIC ULCER OF OTHER PART OF LEFT FOOT WITH BONE INVOLVEMENT WITHOUT EVIDENCE OF NECROSIS: ICD-10-CM

## 2025-02-19 DIAGNOSIS — I25.810 CORONARY ARTERY DISEASE INVOLVING CORONARY BYPASS GRAFT OF NATIVE HEART WITHOUT ANGINA PECTORIS: ICD-10-CM

## 2025-02-19 DIAGNOSIS — E66.09 CLASS 1 OBESITY DUE TO EXCESS CALORIES WITH SERIOUS COMORBIDITY AND BODY MASS INDEX (BMI) OF 32.0 TO 32.9 IN ADULT: ICD-10-CM

## 2025-02-19 DIAGNOSIS — Z00.00 ROUTINE GENERAL MEDICAL EXAMINATION AT HEALTH CARE FACILITY: Primary | ICD-10-CM

## 2025-02-19 DIAGNOSIS — I10 HYPERTENSION, UNSPECIFIED TYPE: ICD-10-CM

## 2025-02-19 DIAGNOSIS — E55.9 VITAMIN D DEFICIENCY: ICD-10-CM

## 2025-02-19 DIAGNOSIS — Z95.1 S/P CABG X 5: ICD-10-CM

## 2025-02-19 DIAGNOSIS — E66.811 CLASS 1 OBESITY DUE TO EXCESS CALORIES WITH SERIOUS COMORBIDITY AND BODY MASS INDEX (BMI) OF 32.0 TO 32.9 IN ADULT: ICD-10-CM

## 2025-02-19 DIAGNOSIS — C61 MALIGNANT NEOPLASM OF PROSTATE (MULTI): ICD-10-CM

## 2025-02-19 DIAGNOSIS — E11.9 TYPE 2 DIABETES MELLITUS WITHOUT COMPLICATION, WITH LONG-TERM CURRENT USE OF INSULIN (MULTI): ICD-10-CM

## 2025-02-19 PROCEDURE — 1170F FXNL STATUS ASSESSED: CPT | Performed by: INTERNAL MEDICINE

## 2025-02-19 PROCEDURE — 1158F ADVNC CARE PLAN TLK DOCD: CPT | Performed by: INTERNAL MEDICINE

## 2025-02-19 PROCEDURE — G0439 PPPS, SUBSEQ VISIT: HCPCS | Performed by: INTERNAL MEDICINE

## 2025-02-19 PROCEDURE — 1159F MED LIST DOCD IN RCRD: CPT | Performed by: INTERNAL MEDICINE

## 2025-02-19 PROCEDURE — 3008F BODY MASS INDEX DOCD: CPT | Performed by: INTERNAL MEDICINE

## 2025-02-19 PROCEDURE — 3078F DIAST BP <80 MM HG: CPT | Performed by: INTERNAL MEDICINE

## 2025-02-19 PROCEDURE — 1157F ADVNC CARE PLAN IN RCRD: CPT | Performed by: INTERNAL MEDICINE

## 2025-02-19 PROCEDURE — 3075F SYST BP GE 130 - 139MM HG: CPT | Performed by: INTERNAL MEDICINE

## 2025-02-19 PROCEDURE — 99397 PER PM REEVAL EST PAT 65+ YR: CPT | Performed by: INTERNAL MEDICINE

## 2025-02-19 PROCEDURE — 1123F ACP DISCUSS/DSCN MKR DOCD: CPT | Performed by: INTERNAL MEDICINE

## 2025-02-19 PROCEDURE — 99214 OFFICE O/P EST MOD 30 MIN: CPT | Performed by: INTERNAL MEDICINE

## 2025-02-19 PROCEDURE — G2211 COMPLEX E/M VISIT ADD ON: HCPCS | Performed by: INTERNAL MEDICINE

## 2025-02-19 PROCEDURE — 1036F TOBACCO NON-USER: CPT | Performed by: INTERNAL MEDICINE

## 2025-02-19 PROCEDURE — 1160F RVW MEDS BY RX/DR IN RCRD: CPT | Performed by: INTERNAL MEDICINE

## 2025-02-19 PROCEDURE — 4010F ACE/ARB THERAPY RXD/TAKEN: CPT | Performed by: INTERNAL MEDICINE

## 2025-02-19 ASSESSMENT — ACTIVITIES OF DAILY LIVING (ADL)
GROCERY_SHOPPING: INDEPENDENT
DOING_HOUSEWORK: INDEPENDENT
DRESSING: INDEPENDENT
MANAGING_FINANCES: INDEPENDENT
BATHING: INDEPENDENT
TAKING_MEDICATION: INDEPENDENT

## 2025-02-19 ASSESSMENT — ENCOUNTER SYMPTOMS
OCCASIONAL FEELINGS OF UNSTEADINESS: 0
DEPRESSION: 0
LOSS OF SENSATION IN FEET: 1

## 2025-02-19 NOTE — PROGRESS NOTES
"Subjective   Reason for Visit: Gabo Weiner is an 74 y.o. male here for a Medicare Wellness visit, yearly physical and follow up    Past Medical, Surgical, and Family History reviewed and updated in chart.    Reviewed all medications by prescribing practitioner or clinical pharmacist (such as prescriptions, OTCs, herbal therapies and supplements) and documented in the medical record.    HPI    Medicare wellness    Yearly physical    Prostate n/a  Colonoscopy 12-24 polyps recheck   CT chest lung cancer screening n/a  immunizations rev'd UTD  BMI 32.1    Follow up    No complaints     Left foot rest of toes amputated (no toes now)  Healed well  Podiatry following / rec's rev'd     CAD / cholesterol stable on rx no side effects  S/P  CABG  cardio following / Dr Wylie     Hand shaking tremor is worse on rx dose increased no side effects  neuro following     DM stable on rx no side effects  FBS  90's  this am  HBA1C 7.4   8-24  neuropathy, microalbuminuria  check blood sugars per endo  endo following  / Dr Moncada  podiatry following / Dr Tavares  [place] / Dr Arenas 12-24         PVD  asymptomatic  Had left leg balloon angioplasty / vasc following ( Dr Neves)  Follow up vascular     hypertension stable on rx no side effects     vit D stable on rx no side effects     renal cancer and prostate cancer stable  urology released     diet / exercise rev'd    Patient Care Team:  Libby Sullivan MD as PCP - General (Internal Medicine)     Review of Systems   All other systems reviewed and are negative.      Objective   Vitals:  /68   Pulse 64   Temp 36.3 °C (97.3 °F)   Ht 1.803 m (5' 11\")   Wt 105 kg (230 lb 12.8 oz)   SpO2 96%   BMI 32.19 kg/m²       Physical Exam  Vitals reviewed.   Constitutional:       Appearance: Normal appearance. He is obese.   HENT:      Head: Normocephalic and atraumatic.      Mouth/Throat:      Pharynx: No posterior oropharyngeal erythema.   Eyes:      General: No scleral icterus.     " Conjunctiva/sclera: Conjunctivae normal.      Pupils: Pupils are equal, round, and reactive to light.   Cardiovascular:      Rate and Rhythm: Normal rate and regular rhythm.      Heart sounds: Normal heart sounds.   Pulmonary:      Effort: No respiratory distress.      Breath sounds: No wheezing.   Abdominal:      General: Abdomen is flat. Bowel sounds are normal. There is no distension.      Palpations: Abdomen is soft. There is no mass.      Tenderness: There is no abdominal tenderness. There is no rebound.   Musculoskeletal:         General: Normal range of motion.      Cervical back: Normal range of motion and neck supple.   Skin:     General: Skin is warm and dry.   Neurological:      General: No focal deficit present.      Mental Status: He is alert and oriented to person, place, and time. Mental status is at baseline.   Psychiatric:         Mood and Affect: Mood normal.         Behavior: Behavior normal.         Thought Content: Thought content normal.         Judgment: Judgment normal.         Assessment & Plan  Routine general medical examination at health care facility         Encounter for subsequent annual wellness visit (AWV) in Medicare patient         Type 2 diabetes mellitus without complication, with long-term current use of insulin (Multi)    Orders:    Albumin-Creatinine Ratio, Urine Random; Future    Hemoglobin A1C; Future    Coronary artery disease involving coronary bypass graft of native heart without angina pectoris         S/P CABG x 5         Hypercholesteremia    Orders:    Comprehensive Metabolic Panel; Future    Lipid Panel; Future    TSH with reflex to Free T4 if abnormal; Future    Hypertension, unspecified type         Heart failure, unspecified HF chronicity, unspecified heart failure type         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 without evidence of necrosis         Essential tremor         Renal cell carcinoma, unspecified laterality  (Multi)    Orders:    CBC and Auto Differential; Future    Malignant neoplasm of prostate (Multi)         Vitamin D deficiency    Orders:    Vitamin D 25-Hydroxy,Total (for eval of Vitamin D levels); Future    Class 1 obesity due to excess calories with serious comorbidity and body mass index (BMI) of 32.0 to 32.9 in adult                     Medicare wellness    Yearly physical    Prostate n/a  Colonoscopy 12-24 polyps recheck   CT chest lung cancer screening n/a  immunizations rev'd UTD  BMI 32.1    Follow up    No complaints     Left foot rest of toes amputated (no toes now)  Healed well  Podiatry following / rec's rev'd     CAD / cholesterol stable on rx no side effects  S/P  CABG  cardio following / Dr Wylie     Hand shaking tremor is worse on rx dose increased no side effects  neuro following     DM stable on rx no side effects  FBS  90's  this am  HBA1C 7.4   8-24  neuropathy, microalbuminuria  check blood sugars per endo  endo following  / Dr Moncada  podiatry following / Dr Chaitanya Torres / Dr Arenas 12-24         PVD  asymptomatic  Had left leg balloon angioplasty / vasc following ( Dr Neves)  Follow up vascular     hypertension stable on rx no side effects     vit D stable on rx no side effects     renal cancer and prostate cancer stable  urology released     diet / exercise rev'd    Check labs, urine  before appt    Follow up 3 months

## 2025-03-11 ENCOUNTER — APPOINTMENT (OUTPATIENT)
Dept: ENDOCRINOLOGY | Facility: CLINIC | Age: 75
End: 2025-03-11
Payer: MEDICARE

## 2025-03-11 VITALS
DIASTOLIC BLOOD PRESSURE: 76 MMHG | HEART RATE: 72 BPM | BODY MASS INDEX: 31.8 KG/M2 | SYSTOLIC BLOOD PRESSURE: 137 MMHG | WEIGHT: 228 LBS

## 2025-03-11 DIAGNOSIS — E11.9 TYPE 2 DIABETES MELLITUS WITHOUT COMPLICATION, WITH LONG-TERM CURRENT USE OF INSULIN (MULTI): Primary | ICD-10-CM

## 2025-03-11 DIAGNOSIS — Z79.4 TYPE 2 DIABETES MELLITUS WITHOUT COMPLICATION, WITH LONG-TERM CURRENT USE OF INSULIN (MULTI): Primary | ICD-10-CM

## 2025-03-11 PROCEDURE — 3075F SYST BP GE 130 - 139MM HG: CPT | Performed by: INTERNAL MEDICINE

## 2025-03-11 PROCEDURE — 99214 OFFICE O/P EST MOD 30 MIN: CPT | Performed by: INTERNAL MEDICINE

## 2025-03-11 PROCEDURE — G2211 COMPLEX E/M VISIT ADD ON: HCPCS | Performed by: INTERNAL MEDICINE

## 2025-03-11 PROCEDURE — 1159F MED LIST DOCD IN RCRD: CPT | Performed by: INTERNAL MEDICINE

## 2025-03-11 PROCEDURE — 1160F RVW MEDS BY RX/DR IN RCRD: CPT | Performed by: INTERNAL MEDICINE

## 2025-03-11 PROCEDURE — 1157F ADVNC CARE PLAN IN RCRD: CPT | Performed by: INTERNAL MEDICINE

## 2025-03-11 PROCEDURE — 4010F ACE/ARB THERAPY RXD/TAKEN: CPT | Performed by: INTERNAL MEDICINE

## 2025-03-11 PROCEDURE — 95251 CONT GLUC MNTR ANALYSIS I&R: CPT | Performed by: INTERNAL MEDICINE

## 2025-03-11 PROCEDURE — 3078F DIAST BP <80 MM HG: CPT | Performed by: INTERNAL MEDICINE

## 2025-03-11 PROCEDURE — 1036F TOBACCO NON-USER: CPT | Performed by: INTERNAL MEDICINE

## 2025-03-11 RX ORDER — BLOOD-GLUCOSE SENSOR
EACH MISCELLANEOUS
Qty: 2 EACH | Refills: 11 | Status: SHIPPED | OUTPATIENT
Start: 2025-03-11

## 2025-03-11 RX ORDER — PEN NEEDLE, DIABETIC 32GX 5/32"
NEEDLE, DISPOSABLE MISCELLANEOUS
Qty: 500 EACH | Refills: 3 | Status: SHIPPED | OUTPATIENT
Start: 2025-03-11

## 2025-03-11 ASSESSMENT — ENCOUNTER SYMPTOMS
NERVOUS/ANXIOUS: 0
ARTHRALGIAS: 0
SORE THROAT: 0
APPETITE CHANGE: 0
WEAKNESS: 1
FREQUENCY: 0
HEADACHES: 0
VOMITING: 0
FEVER: 0
ABDOMINAL PAIN: 0
TREMORS: 1
DIARRHEA: 0
SHORTNESS OF BREATH: 0
POLYDIPSIA: 0
CONSTIPATION: 0
NAUSEA: 0
BACK PAIN: 1
COUGH: 0

## 2025-03-11 NOTE — PATIENT INSTRUCTIONS
RECOMMENDATIONS  Continue current program  A1c with labs for Dr. Sullivan, as ordered  Follow up 6 months

## 2025-03-11 NOTE — PROGRESS NOTES
History Of Present Illness  Gabo Weiner is a 74 y.o. male     Duration of type 2 diabetes mellitus:  20 years  Complicated by neuropathy, albuminuria, CAD, amputations      Back pain, improving    Novolog   Breakfast 6 units  Lunch 10 units  Dinner 10 units  plus corrective scale, add 1 for every 50 over glucose 101 mg/dl.     Tresiba 32 units at bedtime.      Metformin 1000 mg BID  Jardiance 25 mg/day, prescription per Dr. Wylie.      FreeStyle Chelsie 3  Patient is testing glucose 288 times daily  Records reviewed, on file     Last eye exam:  November 2024     Podiatry:  Dr. DIANN Tavares, every 10 weeks    Past Medical History  He has a past medical history of Coronary artery disease, Essential tremor, GERD (gastroesophageal reflux disease), Hyperlipidemia, unspecified (11/10/2022), Malignant neoplasm of prostate (Multi) (09/29/2021), Neuropathy, Personal history of malignant neoplasm, unspecified (07/09/2014), Personal history of other diseases of the nervous system and sense organs (04/01/2020), Personal history of other diseases of the respiratory system (01/07/2016), Personal history of other malignant neoplasm of kidney (11/10/2022), Sleep apnea, Type 2 diabetes mellitus, and Unspecified hearing loss, unspecified ear (07/09/2014).    Surgical History  He has a past surgical history that includes Other surgical history (06/10/2013); Colonoscopy (12/31/2019); Colonoscopy (12/31/2019); Colonoscopy (12/31/2019); Colonoscopy (12/31/2019); Other surgical history (04/17/2014); Other surgical history (04/17/2014); and Coronary artery bypass graft.     Social History  He reports that he has never smoked. He has never been exposed to tobacco smoke. He has never used smokeless tobacco. He reports that he does not drink alcohol and does not use drugs.    Family History  No family history on file.    Medications  Current Outpatient Medications   Medication Instructions    aspirin 81 mg EC tablet Every 24 hours     "atorvastatin (Lipitor) 80 mg tablet Take by mouth.    BD Juhi 2nd Gen Pen Needle 32 gauge x 5/32\" needle 1 each 5 times a day.    cetirizine (ZYRTEC) 10 mg, Daily    cholecalciferol (Vitamin D-3) 5,000 Units tablet Take by mouth.    clopidogrel (Plavix) 75 mg tablet 1 tablet, Daily    empagliflozin (Jardiance) 25 mg 1 tablet, Daily    fluoride, sodium, (Prevident 5000 Booster) 1.1 % dental paste USE AS DIRECTED    FreeStyle Chelsie 14 Day Sensor kit FOR CONTINUOUS GLUCOSE MONITORING    furosemide (Lasix) 40 mg tablet 1 tablet, Daily    insulin aspart (NovoLOG Flexpen U-100 Insulin) 100 unit/mL (3 mL) pen INJECT 6 TO 10 UNITS THREE TIMES DAILY BEFORE MEALS    insulin degludec (TRESIBA FLEXTOUCH U-100) 32 Units, subcutaneous, Nightly    lisinopril 20 mg tablet Take by mouth.    metFORMIN (GLUCOPHAGE) 1,000 mg, oral, 2 times daily    metoprolol tartrate (LOPRESSOR) 100 mg, 2 times daily    metoprolol tartrate (LOPRESSOR) 25 mg, 2 times daily    multivitamin tablet Take by mouth.    primidone (MYSOLINE) 150 mg, oral, 2 times daily       Allergies  Patient has no known allergies.    Review of Systems   Constitutional:  Negative for appetite change and fever.   HENT:  Negative for sore throat.         Denies dry mouth   Eyes:  Negative for visual disturbance.   Respiratory:  Negative for cough and shortness of breath.    Cardiovascular:  Negative for chest pain.   Gastrointestinal:  Negative for abdominal pain, constipation, diarrhea, nausea and vomiting.   Endocrine: Negative for polydipsia and polyuria.   Genitourinary:  Negative for frequency.   Musculoskeletal:  Positive for back pain. Negative for arthralgias.   Skin:  Negative for rash.   Neurological:  Positive for tremors and weakness. Negative for headaches.   Psychiatric/Behavioral:  The patient is not nervous/anxious.          Last Recorded Vitals  Blood pressure 137/76, pulse 72, weight 103 kg (228 lb).    Physical Exam  Constitutional:       General: He is not " in acute distress.  HENT:      Head: Normocephalic.      Mouth/Throat:      Mouth: Mucous membranes are moist.   Eyes:      Extraocular Movements: Extraocular movements intact.   Neck:      Thyroid: No thyroid mass or thyromegaly.   Cardiovascular:      Pulses:           Radial pulses are 2+ on the right side and 2+ on the left side.   Musculoskeletal:      Right lower leg: No edema.      Left lower leg: No edema.      Comments: Mild thenar wasting bilaterally   Lymphadenopathy:      Cervical: No cervical adenopathy.   Neurological:      Mental Status: He is alert.      Motor: Tremor (Coarse hand tremor bilaterally) present.   Psychiatric:         Mood and Affect: Affect normal.          Relevant Results  Glucose   Date Value   08/12/2024 156 mg/dL (H)   08/02/2024 148 mg/dL (H)   07/31/2023 160 mg/dL (H)   07/12/2023 110 MG/DL (H)   07/10/2023 144 MG/DL (H)     POC HEMOGLOBIN A1c (%)   Date Value   11/20/2024 7.0 (A)     Hemoglobin A1C (%)   Date Value   08/02/2024 7.4 (H)   07/31/2023 7.3 (A)   07/10/2023 7.4 (H)   01/25/2023 7.4 (H)     Bicarbonate   Date Value   08/12/2024 33 mmol/L (H)   08/02/2024 31 mmol/L   07/31/2023 31 mmol/L   07/12/2023 25 MMOL/L   07/10/2023 25 MMOL/L     Urea Nitrogen   Date Value   08/12/2024 17 mg/dL   08/02/2024 17 mg/dL   07/31/2023 18 mg/dL   07/12/2023 14 MG/DL   07/10/2023 17 MG/DL     Creatinine   Date Value   08/12/2024 0.78 mg/dL   08/02/2024 0.80 mg/dL   07/31/2023 0.76 mg/dL   07/12/2023 0.8 MG/DL   07/10/2023 0.9 MG/DL     Lab Results   Component Value Date    CHOL 139 08/02/2024    CHOL 138 07/31/2023    CHOL 122 08/08/2022     Lab Results   Component Value Date    HDL 40.4 08/02/2024    HDL 47.2 07/31/2023    HDL 36.0 (A) 08/08/2022     Lab Results   Component Value Date    LDLCALC 66 08/02/2024     Lab Results   Component Value Date    TRIG 164 (H) 08/02/2024    TRIG 147 07/31/2023    TRIG 191 (H) 08/08/2022     Lab Results   Component Value Date    TSH 1.44 08/02/2024      Lab Results   Component Value Date    NIRAJ 139.3 12/15/2017    FDH86HYD 279 (A) 12/15/2017        CGM INTERPRETATION:  Patient is adhering to and benefitting from continuous glucose monitoring.   Model: FreeStyle Chelsie 3  Period reviewed:  14 days  Testing glucose 1440 times daily    Average blood sugar 139 mg/dL, glucose management indicator 6.6%    Glucose less than 70 mg/dL equals 1% of time worn  Glucose ranging between 70 to 180 mg/dL represented by 85% of time worn  Glucose ranging greater than 180 mg/dL represented by 14% of time worn    >72 hours of data reviewed in order to inform diabetes treatment plan decision making, patient currently at risk for recurrent hypoglycemia safety concerns      IMPRESSION  TYPE 2 DIABETES MELLITUS   LONG TERM CURRENT INSULIN USE   Glucose well controlled      RECOMMENDATIONS  Continue current program  A1c with labs for Dr. Sullivan, as ordered  Follow up 6 months    Rx at [facility], not using [employer] Patient Assistance

## 2025-03-11 NOTE — LETTER
March 11, 2025     Libby Sullivan MD  [address]  [facility] [address]    Patient: Gabo Weiner   YOB: 1950   Date of Visit: 3/11/2025       Dear Dr. Libby Sullivan MD:    Thank you for referring Gabo Weiner to me for evaluation. Below are my notes for this consultation.  If you have questions, please do not hesitate to call me. I look forward to following your patient along with you.       Sincerely,     Rogelio Moncada MD      CC: No Recipients  ______________________________________________________________________________________    History Of Present Illness  Gabo Weiner is a 74 y.o. male     Duration of type 2 diabetes mellitus:  20 years  Complicated by neuropathy, albuminuria, CAD, amputations      Back pain, improving    Novolog   Breakfast 6 units  Lunch 10 units  Dinner 10 units  plus corrective scale, add 1 for every 50 over glucose 101 mg/dl.     Tresiba 32 units at bedtime.      Metformin 1000 mg BID  Jardiance 25 mg/day, prescription per Dr. Wylie.      FreeStyle Chelsie 3  Patient is testing glucose 288 times daily  Records reviewed, on file     Last eye exam:  November 2024     Podiatry:  Dr. DIANN Tavares, every 10 weeks    Past Medical History  He has a past medical history of Coronary artery disease, Essential tremor, GERD (gastroesophageal reflux disease), Hyperlipidemia, unspecified (11/10/2022), Malignant neoplasm of prostate (Multi) (09/29/2021), Neuropathy, Personal history of malignant neoplasm, unspecified (07/09/2014), Personal history of other diseases of the nervous system and sense organs (04/01/2020), Personal history of other diseases of the respiratory system (01/07/2016), Personal history of other malignant neoplasm of kidney (11/10/2022), Sleep apnea, Type 2 diabetes mellitus, and Unspecified hearing loss, unspecified ear (07/09/2014).    Surgical History  He has a past surgical history that includes Other surgical history (06/10/2013);  "Colonoscopy (12/31/2019); Colonoscopy (12/31/2019); Colonoscopy (12/31/2019); Colonoscopy (12/31/2019); Other surgical history (04/17/2014); Other surgical history (04/17/2014); and Coronary artery bypass graft.     Social History  He reports that he has never smoked. He has never been exposed to tobacco smoke. He has never used smokeless tobacco. He reports that he does not drink alcohol and does not use drugs.    Family History  No family history on file.    Medications  Current Outpatient Medications   Medication Instructions   • aspirin 81 mg EC tablet Every 24 hours   • atorvastatin (Lipitor) 80 mg tablet Take by mouth.   • BD Juhi 2nd Gen Pen Needle 32 gauge x 5/32\" needle 1 each 5 times a day.   • cetirizine (ZYRTEC) 10 mg, Daily   • cholecalciferol (Vitamin D-3) 5,000 Units tablet Take by mouth.   • clopidogrel (Plavix) 75 mg tablet 1 tablet, Daily   • empagliflozin (Jardiance) 25 mg 1 tablet, Daily   • fluoride, sodium, (Prevident 5000 Booster) 1.1 % dental paste USE AS DIRECTED   • FreeStyle Chelsie 14 Day Sensor kit FOR CONTINUOUS GLUCOSE MONITORING   • furosemide (Lasix) 40 mg tablet 1 tablet, Daily   • insulin aspart (NovoLOG Flexpen U-100 Insulin) 100 unit/mL (3 mL) pen INJECT 6 TO 10 UNITS THREE TIMES DAILY BEFORE MEALS   • insulin degludec (TRESIBA FLEXTOUCH U-100) 32 Units, subcutaneous, Nightly   • lisinopril 20 mg tablet Take by mouth.   • metFORMIN (GLUCOPHAGE) 1,000 mg, oral, 2 times daily   • metoprolol tartrate (LOPRESSOR) 100 mg, 2 times daily   • metoprolol tartrate (LOPRESSOR) 25 mg, 2 times daily   • multivitamin tablet Take by mouth.   • primidone (MYSOLINE) 150 mg, oral, 2 times daily       Allergies  Patient has no known allergies.    Review of Systems   Constitutional:  Negative for appetite change and fever.   HENT:  Negative for sore throat.         Denies dry mouth   Eyes:  Negative for visual disturbance.   Respiratory:  Negative for cough and shortness of breath.    Cardiovascular:  " Negative for chest pain.   Gastrointestinal:  Negative for abdominal pain, constipation, diarrhea, nausea and vomiting.   Endocrine: Negative for polydipsia and polyuria.   Genitourinary:  Negative for frequency.   Musculoskeletal:  Positive for back pain. Negative for arthralgias.   Skin:  Negative for rash.   Neurological:  Positive for tremors and weakness. Negative for headaches.   Psychiatric/Behavioral:  The patient is not nervous/anxious.          Last Recorded Vitals  Blood pressure 137/76, pulse 72, weight 103 kg (228 lb).    Physical Exam  Constitutional:       General: He is not in acute distress.  HENT:      Head: Normocephalic.      Mouth/Throat:      Mouth: Mucous membranes are moist.   Eyes:      Extraocular Movements: Extraocular movements intact.   Neck:      Thyroid: No thyroid mass or thyromegaly.   Cardiovascular:      Pulses:           Radial pulses are 2+ on the right side and 2+ on the left side.   Musculoskeletal:      Right lower leg: No edema.      Left lower leg: No edema.      Comments: Mild thenar wasting bilaterally   Lymphadenopathy:      Cervical: No cervical adenopathy.   Neurological:      Mental Status: He is alert.      Motor: Tremor (Coarse hand tremor bilaterally) present.   Psychiatric:         Mood and Affect: Affect normal.          Relevant Results  Glucose   Date Value   08/12/2024 156 mg/dL (H)   08/02/2024 148 mg/dL (H)   07/31/2023 160 mg/dL (H)   07/12/2023 110 MG/DL (H)   07/10/2023 144 MG/DL (H)     POC HEMOGLOBIN A1c (%)   Date Value   11/20/2024 7.0 (A)     Hemoglobin A1C (%)   Date Value   08/02/2024 7.4 (H)   07/31/2023 7.3 (A)   07/10/2023 7.4 (H)   01/25/2023 7.4 (H)     Bicarbonate   Date Value   08/12/2024 33 mmol/L (H)   08/02/2024 31 mmol/L   07/31/2023 31 mmol/L   07/12/2023 25 MMOL/L   07/10/2023 25 MMOL/L     Urea Nitrogen   Date Value   08/12/2024 17 mg/dL   08/02/2024 17 mg/dL   07/31/2023 18 mg/dL   07/12/2023 14 MG/DL   07/10/2023 17 MG/DL      Creatinine   Date Value   08/12/2024 0.78 mg/dL   08/02/2024 0.80 mg/dL   07/31/2023 0.76 mg/dL   07/12/2023 0.8 MG/DL   07/10/2023 0.9 MG/DL     Lab Results   Component Value Date    CHOL 139 08/02/2024    CHOL 138 07/31/2023    CHOL 122 08/08/2022     Lab Results   Component Value Date    HDL 40.4 08/02/2024    HDL 47.2 07/31/2023    HDL 36.0 (A) 08/08/2022     Lab Results   Component Value Date    LDLCALC 66 08/02/2024     Lab Results   Component Value Date    TRIG 164 (H) 08/02/2024    TRIG 147 07/31/2023    TRIG 191 (H) 08/08/2022     Lab Results   Component Value Date    TSH 1.44 08/02/2024     Lab Results   Component Value Date    ALBUR 139.3 12/15/2017    [id number] 279 (A) 12/15/2017        CGM INTERPRETATION:  Patient is adhering to and benefitting from continuous glucose monitoring.   Model: FreeStyle Chelsie 3  Period reviewed:  14 days  Testing glucose 1440 times daily    Average blood sugar 139 mg/dL, glucose management indicator 6.6%    Glucose less than 70 mg/dL equals 1% of time worn  Glucose ranging between 70 to 180 mg/dL represented by 85% of time worn  Glucose ranging greater than 180 mg/dL represented by 14% of time worn    >72 hours of data reviewed in order to inform diabetes treatment plan decision making, patient currently at risk for recurrent hypoglycemia safety concerns      IMPRESSION  TYPE 2 DIABETES MELLITUS   LONG TERM CURRENT INSULIN USE   Glucose well controlled      RECOMMENDATIONS  Continue current program  A1c with labs for Dr. Sullivan, as ordered  Follow up 6 months    Rx at [facility], not using [employer] Patient Assistance

## 2025-03-31 NOTE — PROGRESS NOTES
Date of Service: 4/1/2025  Patient: Gabo Weiner  MRN: [id number]  Referring Provider: No ref. provider found  PCP: Libby Sullivan MD    History of Present Illness:   Mr. Weiner is a 74 y.o. male who presents to neurology clinic for evaluation of essential tremor. Gabo Weiner's past medical history is pertinent for T2DM, CKD, HTN.    The patient has a 6-8 year history of essential tremor. Action tremor in bilaterally upper extremities and no-no head tremor. Mild vocal tremor.  It bothers him with eating, writing and working with tools. This has worsened over the years. He denies any bradykinesia, rigidity, rest tremor. No hypophonia.  No micrographia.  No shuffling gait.  Family history is negative for tremor.    He is currently on primidone 200 mg twice a day. Possibly slight improvement, especially when eating. He denies any side effects. He has not been any additional medications for essential tremor.    Review of Systems:  The systems were reviewed with pertinent positives and negatives documented in the HPI.      Past Medical & Surgical History  Past Medical History:   Diagnosis Date    Coronary artery disease     Essential tremor     GERD (gastroesophageal reflux disease)     Hyperlipidemia, unspecified 11/10/2022    Hyperlipidemia    Malignant neoplasm of prostate (Multi) 09/29/2021    Adenocarcinoma of prostate-radiation tx    Neuropathy     Personal history of malignant neoplasm, unspecified 07/09/2014    Personal history of malignant neoplasm    Personal history of other diseases of the nervous system and sense organs 04/01/2020    History of impacted cerumen    Personal history of other diseases of the respiratory system 01/07/2016    History of acute sinusitis    Personal history of other malignant neoplasm of kidney 11/10/2022    History of renal cell carcinoma    Sleep apnea     BiPAP    Type 2 diabetes mellitus     Unspecified hearing loss, unspecified ear 07/09/2014    Problems with hearing  "    Past Surgical History:   Procedure Laterality Date    COLONOSCOPY  12/31/2019    Complete Colonoscopy    COLONOSCOPY  12/31/2019    Complete Colonoscopy    COLONOSCOPY  12/31/2019    Complete Colonoscopy    COLONOSCOPY  12/31/2019    Complete Colonoscopy    CORONARY ARTERY BYPASS GRAFT      x5    OTHER SURGICAL HISTORY  06/10/2013    Needle Biopsy Of Prostate    OTHER SURGICAL HISTORY  04/17/2014    History Of Prior Surgery    OTHER SURGICAL HISTORY  04/17/2014    Nephrectomy     Social History:   Social History     Tobacco Use    Smoking status: Never     Passive exposure: Never    Smokeless tobacco: Never    Tobacco comments:     Used smokeless tobacco   Substance Use Topics    Alcohol use: Never     Family History:   No known family history of essential tremor.     Medications:     Current Outpatient Medications:     aspirin 81 mg EC tablet, Take by mouth once every 24 hours., Disp: , Rfl:     atorvastatin (Lipitor) 80 mg tablet, Take by mouth., Disp: , Rfl:     BD Juhi 2nd Gen Pen Needle 32 gauge x 5/32\" needle, Five times daily, Disp: 500 each, Rfl: 3    cetirizine (ZyrTEC) 10 mg tablet, Take 1 tablet (10 mg) by mouth once daily. (Patient taking differently: Take 1 tablet (10 mg) by mouth once daily at bedtime.), Disp: , Rfl:     cholecalciferol (Vitamin D-3) 5,000 Units tablet, Take by mouth., Disp: , Rfl:     clopidogrel (Plavix) 75 mg tablet, Take 1 tablet (75 mg) by mouth once daily., Disp: , Rfl:     empagliflozin (Jardiance) 25 mg, Take 1 tablet (25 mg) by mouth once daily., Disp: , Rfl:     fluoride, sodium, (Prevident 5000 Booster) 1.1 % dental paste, USE AS DIRECTED, Disp: , Rfl:     FreeStyle Chelsie 14 Day Sensor kit, FOR CONTINUOUS GLUCOSE MONITORING, Disp: , Rfl:     FreeStyle Chelsie 3 Plus Sensor device, For continuous glucose monitoring.  Change every 15 days., Disp: 2 each, Rfl: 11    furosemide (Lasix) 40 mg tablet, Take 1 tablet (40 mg) by mouth once daily., Disp: , Rfl:     insulin aspart " (NovoLOG Flexpen U-100 Insulin) 100 unit/mL (3 mL) pen, INJECT 6 TO 10 UNITS THREE TIMES DAILY BEFORE MEALS, Disp: 30 mL, Rfl: 3    insulin degludec (Tresiba FlexTouch U-100) 100 unit/mL (3 mL) injection, Inject 32 Units under the skin once daily at bedtime., Disp: 3 mL, Rfl: 12    lisinopril 20 mg tablet, Take by mouth., Disp: , Rfl:     metFORMIN (Glucophage) 1,000 mg tablet, TAKE 1 TABLET BY MOUTH TWICE A DAY, Disp: 180 tablet, Rfl: 3    metoprolol tartrate (Lopressor) 100 mg tablet, Take 1 tablet (100 mg) by mouth 2 times a day., Disp: , Rfl:     metoprolol tartrate (Lopressor) 25 mg tablet, Take 1 tablet (25 mg) by mouth 2 times a day., Disp: 60 tablet, Rfl: 0    multivitamin tablet, Take by mouth., Disp: , Rfl:     primidone (Mysoline) 50 mg tablet, Take 3 tablets (150 mg) by mouth 2 times a day., Disp: 540 tablet, Rfl: 3     General Physical Exam:  There were no vitals taken for this visit.     He looks well and is not in any acute distress. Breathing comfortably on room air.     Neurological Exam:   Mental status reveals him to be alert and oriented. Speech is intact to conversation.     Cranial nerves:  Lids symmetric; no ptosis. EOMs normal alignment, full range.   No nystagmus.    Normal and symmetric facial strength. Nasolabial folds symmetric.   Hearing intact to conversation.      Motor:  Muscle tone: Normal in both upper and lower extremities.  Movements: bilateral action and postural tremor, worse on left, no-no head tremor    R L   5 5 Shoulder abduction  5 5 Elbow flexion  5 5 Elbow extension  5 5 Finger abduction  5 5 Hip flexion  5 5 Knee flexion  5 5 Knee extension  5 5 Ankle dorsiflexion  5 5 Ankle plantarflexion     Coordination:  In both upper extremities, finger-nose-finger shows bilateral intention tremor.   In both lower extremities, heel-to-shin was intact. GISELL were intact in both upper and lower extremities.      Gait:  Station was stable with a normal base. Gait was stable with a normal  arm swing and speed.     Results:    Lab Results   Component Value Date    HGBA1C 7.0 (A) 11/20/2024     Lab Results   Component Value Date    TSH 1.44 08/02/2024      Lab Results   Component Value Date    CKTOTAL 63 03/10/2023     CBC:   Lab Results   Component Value Date    WBC 7.8 08/02/2024    HGB 16.6 08/02/2024    HCT 49.4 08/02/2024     08/02/2024     BMP:   Lab Results   Component Value Date     08/12/2024    K 4.6 08/12/2024     08/12/2024    CO2 33 (H) 08/12/2024    BUN 17 08/12/2024    CREATININE 0.78 08/12/2024    CALCIUM 9.9 08/12/2024    MG 2.00 03/16/2020    PHOS 4.0 08/12/2024     LFT:   Lab Results   Component Value Date    ALKPHOS 68 08/02/2024    BILITOT 0.6 08/02/2024    PROT 6.5 08/02/2024    ALBUMIN 4.4 08/12/2024    ALT 59 (H) 08/02/2024    AST 34 08/02/2024     Impression:  Gabo Weiner is a 74 y.o. who presents with longstanding essential tremor in bilateral upper extremities, head and voice, currently on primidone 200 mg twice a day.  CBC and CMP ordered by PCP.  No evidence of parkinsonism on exam.    He is currently on metoprolol through his cardiologist.  We discussed increasing the primidone versus adding topiramate.  He prefers to increase the primidone at this time.  We also briefly touched on deep brain stimulation and we can continue to have this discussion in the future.    Plan:  -If tolerating can increase to 200 mg in the morning and 250mg at bedtime for a week  -If tolerating can increase to 250 mg twice a day.    He will call the office if he has questions or concerns.    He will follow-up in 6 months.     Reviewed and approved by KURT MUNOZ on 3/31/25 at 12:30 PM.    I personally spent 20 minutes on the day of the visit completing the review of the medical record and outside records, obtaining history and performing an appropriate physical exam, patient care, counseling and education, placing orders, independently reviewing results, communicating  with the patient, coordinating care and performing appropriate clinical documentation.

## 2025-04-01 ENCOUNTER — APPOINTMENT (OUTPATIENT)
Dept: NEUROLOGY | Facility: CLINIC | Age: 75
End: 2025-04-01
Payer: MEDICARE

## 2025-04-01 ENCOUNTER — TELEPHONE (OUTPATIENT)
Dept: ENDOCRINOLOGY | Facility: CLINIC | Age: 75
End: 2025-04-01

## 2025-04-01 VITALS — HEIGHT: 71 IN | HEART RATE: 67 BPM | BODY MASS INDEX: 31.8 KG/M2

## 2025-04-01 DIAGNOSIS — E11.9 TYPE 2 DIABETES MELLITUS WITHOUT COMPLICATION, WITH LONG-TERM CURRENT USE OF INSULIN: Primary | ICD-10-CM

## 2025-04-01 DIAGNOSIS — Z79.4 TYPE 2 DIABETES MELLITUS WITHOUT COMPLICATION, WITH LONG-TERM CURRENT USE OF INSULIN: Primary | ICD-10-CM

## 2025-04-01 DIAGNOSIS — G25.0 ESSENTIAL TREMOR: Primary | ICD-10-CM

## 2025-04-01 PROCEDURE — 1159F MED LIST DOCD IN RCRD: CPT | Performed by: STUDENT IN AN ORGANIZED HEALTH CARE EDUCATION/TRAINING PROGRAM

## 2025-04-01 PROCEDURE — 1036F TOBACCO NON-USER: CPT | Performed by: STUDENT IN AN ORGANIZED HEALTH CARE EDUCATION/TRAINING PROGRAM

## 2025-04-01 PROCEDURE — 99213 OFFICE O/P EST LOW 20 MIN: CPT | Performed by: STUDENT IN AN ORGANIZED HEALTH CARE EDUCATION/TRAINING PROGRAM

## 2025-04-01 PROCEDURE — 1126F AMNT PAIN NOTED NONE PRSNT: CPT | Performed by: STUDENT IN AN ORGANIZED HEALTH CARE EDUCATION/TRAINING PROGRAM

## 2025-04-01 PROCEDURE — 1160F RVW MEDS BY RX/DR IN RCRD: CPT | Performed by: STUDENT IN AN ORGANIZED HEALTH CARE EDUCATION/TRAINING PROGRAM

## 2025-04-01 PROCEDURE — 4010F ACE/ARB THERAPY RXD/TAKEN: CPT | Performed by: STUDENT IN AN ORGANIZED HEALTH CARE EDUCATION/TRAINING PROGRAM

## 2025-04-01 PROCEDURE — 1157F ADVNC CARE PLAN IN RCRD: CPT | Performed by: STUDENT IN AN ORGANIZED HEALTH CARE EDUCATION/TRAINING PROGRAM

## 2025-04-01 RX ORDER — BLOOD-GLUCOSE,RECEIVER,CONT
EACH MISCELLANEOUS
Qty: 1 EACH | Refills: 0 | Status: SHIPPED | OUTPATIENT
Start: 2025-04-01

## 2025-04-01 RX ORDER — PRIMIDONE 250 MG/1
250 TABLET ORAL 2 TIMES DAILY
Qty: 180 TABLET | Refills: 3 | Status: SHIPPED | OUTPATIENT
Start: 2025-04-01 | End: 2026-04-01

## 2025-04-01 ASSESSMENT — PAIN SCALES - GENERAL: PAINLEVEL_OUTOF10: 0-NO PAIN

## 2025-04-01 NOTE — TELEPHONE ENCOUNTER
Pt stopped in office with his [employer] paperwork. He asks that he be called to pick it up when completed for his  to send in.   Pt mentioned he is having issues with his Chelsie sensor. Pt is using a chelsie 3 plus sensor but has a chelsie 2 reader and explained to pt that the two are non compatible . Verified [facility] in [place].

## 2025-04-01 NOTE — PATIENT INSTRUCTIONS
It was a pleasure seeing you today.    As we discussed we will continue to increase your primidone  Take 4 (200mg) pills in the morning, 5 (250mg) at bedtime for a week  Then take 5 (250mg) pills twice a day until you  your new prescription.    I've sent a new prescription which one pill is 250mg. When you start the new prescription you can just take 1 pill twice a day.    We also discussed deep brain stimulation we we can continue to discuss in the future.    -assistive devices for tremor are listed on the essential tremor website: https://essentialtremor.org/resource/assistive-devices/    If you have any questions or concerns please call my office at [phone].

## 2025-05-06 ENCOUNTER — OFFICE VISIT (OUTPATIENT)
Dept: VASCULAR SURGERY | Facility: CLINIC | Age: 75
End: 2025-05-06
Payer: MEDICARE

## 2025-05-06 ENCOUNTER — ANCILLARY PROCEDURE (OUTPATIENT)
Dept: VASCULAR MEDICINE | Facility: CLINIC | Age: 75
End: 2025-05-06
Payer: MEDICARE

## 2025-05-06 VITALS
HEART RATE: 71 BPM | BODY MASS INDEX: 32.34 KG/M2 | RESPIRATION RATE: 16 BRPM | DIASTOLIC BLOOD PRESSURE: 72 MMHG | WEIGHT: 231 LBS | HEIGHT: 71 IN | SYSTOLIC BLOOD PRESSURE: 155 MMHG

## 2025-05-06 DIAGNOSIS — I73.9 PAD (PERIPHERAL ARTERY DISEASE) (CMS-HCC): ICD-10-CM

## 2025-05-06 DIAGNOSIS — I73.9 PAD (PERIPHERAL ARTERY DISEASE) (CMS-HCC): Primary | ICD-10-CM

## 2025-05-06 PROCEDURE — 4010F ACE/ARB THERAPY RXD/TAKEN: CPT | Performed by: SURGERY

## 2025-05-06 PROCEDURE — 1157F ADVNC CARE PLAN IN RCRD: CPT | Performed by: SURGERY

## 2025-05-06 PROCEDURE — 93922 UPR/L XTREMITY ART 2 LEVELS: CPT

## 2025-05-06 PROCEDURE — 1036F TOBACCO NON-USER: CPT | Performed by: SURGERY

## 2025-05-06 PROCEDURE — 3077F SYST BP >= 140 MM HG: CPT | Performed by: SURGERY

## 2025-05-06 PROCEDURE — 1159F MED LIST DOCD IN RCRD: CPT | Performed by: SURGERY

## 2025-05-06 PROCEDURE — 99212 OFFICE O/P EST SF 10 MIN: CPT | Performed by: SURGERY

## 2025-05-06 PROCEDURE — 3078F DIAST BP <80 MM HG: CPT | Performed by: SURGERY

## 2025-05-06 PROCEDURE — 3008F BODY MASS INDEX DOCD: CPT | Performed by: SURGERY

## 2025-05-06 PROCEDURE — 1126F AMNT PAIN NOTED NONE PRSNT: CPT | Performed by: SURGERY

## 2025-05-06 PROCEDURE — 93922 UPR/L XTREMITY ART 2 LEVELS: CPT | Performed by: SURGERY

## 2025-05-06 ASSESSMENT — LIFESTYLE VARIABLES
AUDIT-C TOTAL SCORE: 0
HOW OFTEN DO YOU HAVE SIX OR MORE DRINKS ON ONE OCCASION: NEVER
SKIP TO QUESTIONS 9-10: 1
HAVE YOU OR SOMEONE ELSE BEEN INJURED AS A RESULT OF YOUR DRINKING: NO
HOW MANY STANDARD DRINKS CONTAINING ALCOHOL DO YOU HAVE ON A TYPICAL DAY: PATIENT DOES NOT DRINK
AUDIT TOTAL SCORE: 0
HAS A RELATIVE, FRIEND, DOCTOR, OR ANOTHER HEALTH PROFESSIONAL EXPRESSED CONCERN ABOUT YOUR DRINKING OR SUGGESTED YOU CUT DOWN: NO
HOW OFTEN DO YOU HAVE A DRINK CONTAINING ALCOHOL: NEVER

## 2025-05-06 ASSESSMENT — ENCOUNTER SYMPTOMS
DEPRESSION: 0
OCCASIONAL FEELINGS OF UNSTEADINESS: 0
LOSS OF SENSATION IN FEET: 0

## 2025-05-06 ASSESSMENT — PAIN SCALES - GENERAL: PAINLEVEL_OUTOF10: 0-NO PAIN

## 2025-05-06 NOTE — PROGRESS NOTES
Patient comes for follow-up of his peripheral vascular disease.  He had a previous left leg intervention and TMA and I first saw him a year ago when he transitioned in care from Dr. Neves's office.  He has been well since my last visit with him in October.  His SHWETHA testing shows normal flows in both legs.  He has no pain.  He has no ulcerations.  He sees his podiatrist regularly.    I encouraged him to exercise regularly.  He does play in a golf league.  He should figure out a way to exercise through the winter months.  His cardiovascular risk factors is managed by his primary care physician.      I will see him back in a year with an SHWETHA.

## 2025-05-09 LAB
25(OH)D3+25(OH)D2 SERPL-MCNC: 46 NG/ML (ref 30–100)
ALBUMIN SERPL-MCNC: 4.4 G/DL (ref 3.6–5.1)
ALBUMIN/CREAT UR: 116 MG/G CREAT
ALP SERPL-CCNC: 74 U/L (ref 35–144)
ALT SERPL-CCNC: 33 U/L (ref 9–46)
ANION GAP SERPL CALCULATED.4IONS-SCNC: 11 MMOL/L (CALC) (ref 7–17)
AST SERPL-CCNC: 21 U/L (ref 10–35)
BASOPHILS # BLD AUTO: 42 CELLS/UL (ref 0–200)
BASOPHILS NFR BLD AUTO: 0.5 %
BILIRUB SERPL-MCNC: 0.5 MG/DL (ref 0.2–1.2)
BUN SERPL-MCNC: 16 MG/DL (ref 7–25)
CALCIUM SERPL-MCNC: 9.3 MG/DL (ref 8.6–10.3)
CHLORIDE SERPL-SCNC: 105 MMOL/L (ref 98–110)
CHOLEST SERPL-MCNC: 157 MG/DL
CHOLEST/HDLC SERPL: 3.3 (CALC)
CO2 SERPL-SCNC: 29 MMOL/L (ref 20–32)
CREAT SERPL-MCNC: 0.72 MG/DL (ref 0.7–1.28)
CREAT UR-MCNC: 85 MG/DL (ref 20–320)
EGFRCR SERPLBLD CKD-EPI 2021: 96 ML/MIN/1.73M2
EOSINOPHIL # BLD AUTO: 319 CELLS/UL (ref 15–500)
EOSINOPHIL NFR BLD AUTO: 3.8 %
ERYTHROCYTE [DISTWIDTH] IN BLOOD BY AUTOMATED COUNT: 12.7 % (ref 11–15)
EST. AVERAGE GLUCOSE BLD GHB EST-MCNC: 157 MG/DL
EST. AVERAGE GLUCOSE BLD GHB EST-SCNC: 8.7 MMOL/L
GLUCOSE SERPL-MCNC: 116 MG/DL (ref 65–99)
HBA1C MFR BLD: 7.1 %
HCT VFR BLD AUTO: 52.1 % (ref 38.5–50)
HDLC SERPL-MCNC: 47 MG/DL
HGB BLD-MCNC: 17.7 G/DL (ref 13.2–17.1)
LDLC SERPL CALC-MCNC: 85 MG/DL (CALC)
LYMPHOCYTES # BLD AUTO: 1814 CELLS/UL (ref 850–3900)
LYMPHOCYTES NFR BLD AUTO: 21.6 %
MCH RBC QN AUTO: 33.8 PG (ref 27–33)
MCHC RBC AUTO-ENTMCNC: 34 G/DL (ref 32–36)
MCV RBC AUTO: 99.4 FL (ref 80–100)
MICROALBUMIN UR-MCNC: 9.9 MG/DL
MONOCYTES # BLD AUTO: 613 CELLS/UL (ref 200–950)
MONOCYTES NFR BLD AUTO: 7.3 %
NEUTROPHILS # BLD AUTO: 5611 CELLS/UL (ref 1500–7800)
NEUTROPHILS NFR BLD AUTO: 66.8 %
NONHDLC SERPL-MCNC: 110 MG/DL (CALC)
PLATELET # BLD AUTO: 185 THOUSAND/UL (ref 140–400)
PMV BLD REES-ECKER: 12.6 FL (ref 7.5–12.5)
POTASSIUM SERPL-SCNC: 4.6 MMOL/L (ref 3.5–5.3)
PROT SERPL-MCNC: 6.4 G/DL (ref 6.1–8.1)
RBC # BLD AUTO: 5.24 MILLION/UL (ref 4.2–5.8)
SODIUM SERPL-SCNC: 145 MMOL/L (ref 135–146)
TRIGL SERPL-MCNC: 157 MG/DL
TSH SERPL-ACNC: 1.13 MIU/L (ref 0.4–4.5)
WBC # BLD AUTO: 8.4 THOUSAND/UL (ref 3.8–10.8)

## 2025-05-20 ENCOUNTER — APPOINTMENT (OUTPATIENT)
Dept: PRIMARY CARE | Facility: CLINIC | Age: 75
End: 2025-05-20
Payer: MEDICARE

## 2025-05-20 VITALS
BODY MASS INDEX: 32.52 KG/M2 | SYSTOLIC BLOOD PRESSURE: 142 MMHG | DIASTOLIC BLOOD PRESSURE: 60 MMHG | HEART RATE: 67 BPM | TEMPERATURE: 97.5 F | WEIGHT: 233.2 LBS | OXYGEN SATURATION: 97 %

## 2025-05-20 DIAGNOSIS — Z89.439 HISTORY OF TRANSMETATARSAL AMPUTATION OF FOOT (MULTI): ICD-10-CM

## 2025-05-20 DIAGNOSIS — Z79.4 TYPE 2 DIABETES MELLITUS WITHOUT COMPLICATION, WITH LONG-TERM CURRENT USE OF INSULIN: ICD-10-CM

## 2025-05-20 DIAGNOSIS — E55.9 VITAMIN D DEFICIENCY: ICD-10-CM

## 2025-05-20 DIAGNOSIS — E66.811 CLASS 1 OBESITY DUE TO EXCESS CALORIES WITH SERIOUS COMORBIDITY AND BODY MASS INDEX (BMI) OF 32.0 TO 32.9 IN ADULT: ICD-10-CM

## 2025-05-20 DIAGNOSIS — Z71.2 ENCOUNTER TO DISCUSS TEST RESULTS: Primary | ICD-10-CM

## 2025-05-20 DIAGNOSIS — I10 HYPERTENSION, UNSPECIFIED TYPE: ICD-10-CM

## 2025-05-20 DIAGNOSIS — I50.9 CHRONIC HEART FAILURE, UNSPECIFIED HEART FAILURE TYPE: ICD-10-CM

## 2025-05-20 DIAGNOSIS — Z95.1 S/P CABG X 5: ICD-10-CM

## 2025-05-20 DIAGNOSIS — C64.9 RENAL CELL CARCINOMA, UNSPECIFIED LATERALITY: ICD-10-CM

## 2025-05-20 DIAGNOSIS — I70.245 ATHEROSCLEROSIS OF NATIVE ARTERIES OF LEFT LEG WITH ULCERATION OF OTHER PART OF FOOT (MULTI): ICD-10-CM

## 2025-05-20 DIAGNOSIS — E11.9 TYPE 2 DIABETES MELLITUS WITHOUT COMPLICATION, WITH LONG-TERM CURRENT USE OF INSULIN: ICD-10-CM

## 2025-05-20 DIAGNOSIS — E66.09 CLASS 1 OBESITY DUE TO EXCESS CALORIES WITH SERIOUS COMORBIDITY AND BODY MASS INDEX (BMI) OF 32.0 TO 32.9 IN ADULT: ICD-10-CM

## 2025-05-20 DIAGNOSIS — E78.00 HYPERCHOLESTEREMIA: ICD-10-CM

## 2025-05-20 PROBLEM — E11.621 TYPE 2 DIABETES MELLITUS WITH FOOT ULCER (CODE): Status: RESOLVED | Noted: 2023-11-03 | Resolved: 2025-05-20

## 2025-05-20 PROBLEM — E11.621 TYPE 2 DIABETES MELLITUS WITH FOOT ULCER (CODE): Status: ACTIVE | Noted: 2023-11-03

## 2025-05-20 PROBLEM — E11.51 TYPE 2 DIABETES MELLITUS WITH PERIPHERAL ANGIOPATHY: Status: RESOLVED | Noted: 2023-11-09 | Resolved: 2025-05-20

## 2025-05-20 PROBLEM — E11.40 TYPE 2 DIABETES MELLITUS WITH DIABETIC NEUROPATHY, WITHOUT LONG-TERM CURRENT USE OF INSULIN: Status: RESOLVED | Noted: 2021-07-07 | Resolved: 2025-05-20

## 2025-05-20 PROCEDURE — 1036F TOBACCO NON-USER: CPT | Performed by: INTERNAL MEDICINE

## 2025-05-20 PROCEDURE — 3077F SYST BP >= 140 MM HG: CPT | Performed by: INTERNAL MEDICINE

## 2025-05-20 PROCEDURE — 3078F DIAST BP <80 MM HG: CPT | Performed by: INTERNAL MEDICINE

## 2025-05-20 PROCEDURE — 1159F MED LIST DOCD IN RCRD: CPT | Performed by: INTERNAL MEDICINE

## 2025-05-20 PROCEDURE — 99214 OFFICE O/P EST MOD 30 MIN: CPT | Performed by: INTERNAL MEDICINE

## 2025-05-20 PROCEDURE — G0446 INTENS BEHAVE THER CARDIO DX: HCPCS | Performed by: INTERNAL MEDICINE

## 2025-05-20 PROCEDURE — 1160F RVW MEDS BY RX/DR IN RCRD: CPT | Performed by: INTERNAL MEDICINE

## 2025-05-20 PROCEDURE — 4010F ACE/ARB THERAPY RXD/TAKEN: CPT | Performed by: INTERNAL MEDICINE

## 2025-05-20 PROCEDURE — G2211 COMPLEX E/M VISIT ADD ON: HCPCS | Performed by: INTERNAL MEDICINE

## 2025-05-20 NOTE — PROGRESS NOTES
Subjective   Patient ID: Gabo Weiner is a 75 y.o. male who presents for Follow-up (3 month ).  HPI               Routine follow up                Labs rev'd                Feels well    Left foot rest of toes amputated (no toes now)  Healed well  Podiatry following      CAD / cholesterol stable on rx no side effects  S/P  CABG  cardio following / Dr Wylie     Hand shaking tremor is worse on rx dose increased no side effects  neuro following     DM stable on rx no side effects  FBS 97  this am  HBA1C 7.1  5-25  neuropathy, microalbuminuria  check blood sugars per endo  endo following  / Dr Moncada  podiatry following / Dr Tavares  [place] / Dr Arenas 12-24         PVD  asymptomatic  Had left leg balloon angioplasty / vasc following ( Dr Neves)  Follow up vascular     hypertension stable on rx no side effects     vit D stable on rx no side effects     renal cancer and prostate cancer stable  urology released     diet / exercise rev'd     Review of Systems   All other systems reviewed and are negative.      Objective   /60   Pulse 67   Temp 36.4 °C (97.5 °F)   Wt 106 kg (233 lb 3.2 oz)   SpO2 97%   BMI 32.52 kg/m²   Lab Results   Component Value Date    WBC 8.4 05/08/2025    HGB 17.7 (H) 05/08/2025    HCT 52.1 (H) 05/08/2025     05/08/2025    CHOL 157 05/08/2025    TRIG 157 (H) 05/08/2025    HDL 47 05/08/2025    ALT 33 05/08/2025    AST 21 05/08/2025     05/08/2025    K 4.6 05/08/2025     05/08/2025    CREATININE 0.72 05/08/2025    BUN 16 05/08/2025    CO2 29 05/08/2025    TSH 1.13 05/08/2025    PSA 0.16 11/09/2023    INR 1.0 07/10/2023    HGBA1C 7.1 (H) 05/08/2025    ALBUR 139.3 12/15/2017           Physical Exam  Vitals reviewed.   Constitutional:       Appearance: Normal appearance. He is obese.   HENT:      Head: Normocephalic and atraumatic.      Mouth/Throat:      Pharynx: No posterior oropharyngeal erythema.   Eyes:      General: No scleral icterus.     Conjunctiva/sclera: Conjunctivae  normal.      Pupils: Pupils are equal, round, and reactive to light.   Cardiovascular:      Rate and Rhythm: Normal rate and regular rhythm.      Heart sounds: Normal heart sounds.   Pulmonary:      Effort: No respiratory distress.      Breath sounds: No wheezing.   Abdominal:      General: Abdomen is flat. Bowel sounds are normal. There is no distension.      Palpations: Abdomen is soft. There is no mass.      Tenderness: There is no abdominal tenderness. There is no rebound.   Musculoskeletal:         General: Normal range of motion.      Cervical back: Normal range of motion and neck supple.   Skin:     General: Skin is warm and dry.   Neurological:      General: No focal deficit present.      Mental Status: He is alert and oriented to person, place, and time. Mental status is at baseline.   Psychiatric:         Mood and Affect: Mood normal.         Behavior: Behavior normal.         Thought Content: Thought content normal.         Judgment: Judgment normal.         Problem List Items Addressed This Visit           ICD-10-CM    Diabetes mellitus (Multi) E11.9    Hypertension I10    Obesity E66.9    S/P CABG x 5 Z95.1    Vitamin D deficiency E55.9    Heart failure I50.9    Renal cell carcinoma C64.9    Atherosclerosis of native arteries of left leg with ulceration of other part of foot (Multi) I70.245    History of transmetatarsal amputation of foot (Multi) Z89.439     Other Visit Diagnoses         Codes      Encounter to discuss test results    -  Primary Z71.2      Hypercholesteremia     E78.00          Assessment/Plan      Labs rev'd                Feels well    Left foot rest of toes amputated (no toes now)  Healed well  Podiatry following      CAD / cholesterol stable on rx no side effects  S/P  CABG  cardio following / Dr Wylie  I spent 15 minutes face-to-face with this individual discussing their cardiovascular risk and behavioral therapies of nutritional choices, exercise, and elimination of habits  contributing to risk. We agreed on plan how they may be able to reduce their current cardiovascular risk.  Patient 10 year cardiac risk estimate calculates :   46.5    %      Hand shaking tremor is worse on rx dose increased no side effects  neuro following     DM stable on rx no side effects  FBS 97  this am  HBA1C 7.1  5-25  neuropathy, microalbuminuria  check blood sugars per endo  endo following  / Dr Moncada  podiatry following / Dr Chaitanya Torres / Dr Arenas 12-24         PVD  asymptomatic  Had left leg balloon angioplasty / vasc following ( Dr Neves)  Follow up vascular     hypertension stable on rx no side effects     vit D stable on rx no side effects     renal cancer and prostate cancer stable  urology released     diet / exercise rev'd    Prostate n/a  Colonoscopy 12-24 polyps recheck   CT chest lung cancer screening n/a  immunizations rev'd UTD  BMI 32.5    Follow up 3 months

## 2025-06-27 DIAGNOSIS — E11.9 TYPE 2 DIABETES MELLITUS WITHOUT COMPLICATIONS: ICD-10-CM

## 2025-06-27 RX ORDER — INSULIN ASPART 100 [IU]/ML
6-10 INJECTION, SOLUTION INTRAVENOUS; SUBCUTANEOUS
Qty: 15 ML | Refills: 3 | Status: SHIPPED | OUTPATIENT
Start: 2025-06-27

## 2025-07-09 ENCOUNTER — APPOINTMENT (OUTPATIENT)
Dept: OTOLARYNGOLOGY | Facility: CLINIC | Age: 75
End: 2025-07-09
Payer: MEDICARE

## 2025-07-09 DIAGNOSIS — H61.23 BILATERAL IMPACTED CERUMEN: Primary | ICD-10-CM

## 2025-07-09 NOTE — PROGRESS NOTES
Chief Complaint     ear cleaning       History of Present Illness     07/09.2025:  Wax was cleaned bilaterally.  Tympanic membranes look intact bilaterally.    Plan  1-follow-up in one year  _________________________________________________________________    07.10.2024: Wax was cleaned bilaterally.  Tympanic membranes look intact bilaterally.    Plan  1-follow-up in one year  ______________________________________________________________________    06.07.2023: He comes to get his ears checked and cleaned. Wax was cleaned bilaterally. TMs look normal.  He has bilateral decreased hearing.     Recommendations:  1- follow up once a year     _______________________________________________________________________________________________      06.03.2022: He comes to get his ears checked and cleaned. Wax was cleaned bilaterally. TMs look normal.  He has bilateral decreased hearing.     Recommendations:  1- follow up once a year     _______________________________________________________________________________________________      Mr. Weiner is a 69 yo M. He comes to get his ears checked and cleaned.  He has hearing deficit for years. He is interested in using hearing aids.  History of working in a noisy environment (+).      06.04.2021: He comes for follow up. No ear issues.      On examination, there was some ear wax bilaterally, more on the left side. Cleaning was done. Tympanic membranes look normal.  Nasal septum deviated to right superiorly and to left inferiorly. Septum touches left inferior turbinate.  Tonsillectomy (+)     Plan:  1- follow up in 1 year      Review of Systems     Constitutional: no fever.   ENMT: difficulty with hearing~and~hearing loss.      Physical Exam  (old exam note)  General appearance: Healthy-appearing, well-nourished, well groomed, in no acute distress.      Head and Face Head and face: Atraumatic with no masses, lesions, or scarring.      Salivary glands: No tenderness of the parotid  glands or parotid masses. (old exam)     No tenderness of the submandibular glands or submandibular masses. (old exam)     Facial strength: Normal strength and symmetry, no synkinesis or facial tic.      Eyes: Conjunctivas look non-hyperemic bilaterally     Ears: Bilaterally wax was cleaned, ear canals look normal. Tympanic membranes intact, no hyperemia, fluid or retraction. Hearing grossly normal.      Nose: Mucosa looks normal. No purulent discharge. Septum deviated to left inferiorly and to right superiorly. (old exam)     Oral Cavity/Mouth: Lips and tongue look normal. (old exam)     Throat: No postnasal discharge. No tonsil hypertrophy. No hyperemia.(old exam)     Neck: Symmetrical, trachea midline. (old exam)     Pulmonary: Normal respiratory effort.      Lymphatic No palpable pathologic lymph nodes at neck. (old exam)     Neurological/Psychiatric Orientation to person, place, and time: Normal.   Mood and affect: Normal.      Extremities: no clubbing        Procedure  EAR WAX REMOVAL 07.10.2024  Patient had bilateral ear wax. Using otoscope and suction cleaning was done. Patient tolerated the procedure well.           Diagnoses/Problems     · Bilateral impacted cerumen (380.4) (H61.23)      Patient Discussion/Summary     07/09.2025:  Wax was cleaned bilaterally.  Tympanic membranes look intact bilaterally.    Plan  1-follow-up in one year  _________________________________________________________________    07.10.2024: Wax was cleaned bilaterally.  Tympanic membranes look intact bilaterally.    Plan  1-follow-up in one year  ______________________________________________________________________    06.07.2023: He comes to get his ears checked and cleaned. Wax was cleaned bilaterally. TMs look normal.  He has bilateral decreased hearing.     Recommendations:  1- follow up once a year     _______________________________________________________________________________________________     06.03.2022: He comes to  "get his ears checked and cleaned. Wax was cleaned bilaterally. TMs look normal.  He has bilateral decreased hearing.     Recommendations:  1- follow up once a year     _______________________________________________________________________________________________        PATIENT EDUCATION - EAR WAX     What is ear wax impaction?  Ear wax impaction is when ear wax builds up enough to cause symptoms. Normally, ear wax helps to protect the insides of the ears and prevents injury or infection. But having too much ear wax can cause symptoms such as pain and trouble hearing. The medical term for ear wax is \"cerumen.\"     Young children and older adults are more likely than others to have ear wax impaction.     What causes ear wax impaction?  Several different things can cause ear wax impaction:     Diseases that affect the ear - Some health problems can affect the shape of the inside of the ear, and make it hard for wax to move out. For example, skin problems that cause skin cells to shed a lot can lead to wax build-up in the ears.     A narrow ear canal - In some people, the ear canals are narrower than in others. These people might be more likely to have ear wax impaction. A person's ear canal can become narrower after an ear injury or after severe or multiple ear infections.     Changes in ear wax and lining due to aging - As people get older, their ear wax gets harder and thicker. This makes it difficult for the wax to move out of the ear as it should.     Bad ear-cleaning habits - Some people try to clean their ears using cotton swabs (Q-Tips) or other tools. This can actually push the wax deeper into the ear instead of getting it out. Over time, this can cause ear wax impaction.     Making too much ear wax - Some people make more ear wax than others. This can happen when water gets trapped in the ear, or when the ear is injured. But some people have a lot of ear wax for no obvious reason.     What are the symptoms of " "ear wax impaction?  The symptoms include:     Trouble hearing     Pain in the ear     Hearing a ringing noise in the ear     Feeling like the ear is blocked or plugged     These symptoms can happen in one or both ears.     Should I see a doctor or nurse?  Yes. If you or your child has any of these symptoms, see a doctor or nurse. They can check the insides of the ears to figure out if the symptoms are caused by ear wax impaction or another problem, such as an ear infection.     Should I clean my (or my child's) ears at home?  No. The insides of the ears do not usually need to be cleaned. Sticking anything into the ears can push the wax in deeper and cause impaction.     \"Ear candling\" involves lighting one end of a hollow candle, and putting the other end in the ear. Ear candling is not recommended for ear wax removal. It does not remove ear wax and can even cause ear injuries and burns.     How is ear wax impaction treated?  There are several treatments to remove impacted ear wax. Doctors and nurses offer these treatments only to people who have bothersome symptoms. They do not recommend treatments for removing ear wax in people who have no symptoms, even if their ears are impacted.     In some cases, doctors and nurses will remove ear wax in people whose ears are impacted and who aren't able to let others know if they have symptoms or not. This can include young children, and people who are confused or have trouble speaking, including some older adults.     There are several different ways to remove ear wax:     Ear drops - Special ear drops can soften ear wax and help it to drain out. Ear drops are not usually safe for people with an ear infection or damage to the eardrum.     Rinsing - In some cases, a doctor or nurse can remove impacted ear wax by squirting water (or a special liquid) into the ear to rinse it out.     Special tools - A doctor or nurse might use a special tool to remove ear wax. There are " different types of tools that can do this safely. These include small sticks, hooks, and spoons. There are also tools that use suction to pull the wax out.

## 2025-07-09 NOTE — PROGRESS NOTES
Chief Complaint     ear cleaning       History of Present Illness  07.09.2025.  ______________________________________________________________________       07.10.2024: Wax was cleaned bilaterally.  Tympanic membranes look intact bilaterally.    Plan  1-follow-up in one year  ______________________________________________________________________    06.07.2023: He comes to get his ears checked and cleaned. Wax was cleaned bilaterally. TMs look normal.  He has bilateral decreased hearing.     Recommendations:  1- follow up once a year     _______________________________________________________________________________________________      06.03.2022: He comes to get his ears checked and cleaned. Wax was cleaned bilaterally. TMs look normal.  He has bilateral decreased hearing.     Recommendations:  1- follow up once a year     _______________________________________________________________________________________________      Mr. Weiner is a 71 yo M. He comes to get his ears checked and cleaned.  He has hearing deficit for years. He is interested in using hearing aids.  History of working in a noisy environment (+).      06.04.2021: He comes for follow up. No ear issues.      On examination, there was some ear wax bilaterally, more on the left side. Cleaning was done. Tympanic membranes look normal.  Nasal septum deviated to right superiorly and to left inferiorly. Septum touches left inferior turbinate.  Tonsillectomy (+)     Plan:  1- follow up in 1 year      Review of Systems     Constitutional: no fever.   ENMT: difficulty with hearing~and~hearing loss.      Physical Exam  (old exam note)  General appearance: Healthy-appearing, well-nourished, well groomed, in no acute distress.      Head and Face Head and face: Atraumatic with no masses, lesions, or scarring.      Salivary glands: No tenderness of the parotid glands or parotid masses. (old exam)     No tenderness of the submandibular glands or submandibular  masses. (old exam)     Facial strength: Normal strength and symmetry, no synkinesis or facial tic.      Eyes: Conjunctivas look non-hyperemic bilaterally     Ears: Bilaterally wax was cleaned, ear canals look normal. Tympanic membranes intact, no hyperemia, fluid or retraction. Hearing grossly normal.      Nose: Mucosa looks normal. No purulent discharge. Septum deviated to left inferiorly and to right superiorly. (old exam)     Oral Cavity/Mouth: Lips and tongue look normal. (old exam)     Throat: No postnasal discharge. No tonsil hypertrophy. No hyperemia.(old exam)     Neck: Symmetrical, trachea midline. (old exam)     Pulmonary: Normal respiratory effort.      Lymphatic No palpable pathologic lymph nodes at neck. (old exam)     Neurological/Psychiatric Orientation to person, place, and time: Normal.   Mood and affect: Normal.      Extremities: no clubbing        Procedure  EAR WAX REMOVAL 07.10.2024  Patient had bilateral ear wax. Using otoscope and suction cleaning was done. Patient tolerated the procedure well.           Diagnoses/Problems     · Bilateral impacted cerumen (380.4) (H61.23)      Patient Discussion/Summary     07.10.2024: Wax was cleaned bilaterally.  Tympanic membranes look intact bilaterally.    Plan  1-follow-up in one year  ______________________________________________________________________    06.07.2023: He comes to get his ears checked and cleaned. Wax was cleaned bilaterally. TMs look normal.  He has bilateral decreased hearing.     Recommendations:  1- follow up once a year     _______________________________________________________________________________________________     06.03.2022: He comes to get his ears checked and cleaned. Wax was cleaned bilaterally. TMs look normal.  He has bilateral decreased hearing.     Recommendations:  1- follow up once a year     _______________________________________________________________________________________________        PATIENT EDUCATION -  "EAR WAX     What is ear wax impaction?  Ear wax impaction is when ear wax builds up enough to cause symptoms. Normally, ear wax helps to protect the insides of the ears and prevents injury or infection. But having too much ear wax can cause symptoms such as pain and trouble hearing. The medical term for ear wax is \"cerumen.\"     Young children and older adults are more likely than others to have ear wax impaction.     What causes ear wax impaction?  Several different things can cause ear wax impaction:     Diseases that affect the ear - Some health problems can affect the shape of the inside of the ear, and make it hard for wax to move out. For example, skin problems that cause skin cells to shed a lot can lead to wax build-up in the ears.     A narrow ear canal - In some people, the ear canals are narrower than in others. These people might be more likely to have ear wax impaction. A person's ear canal can become narrower after an ear injury or after severe or multiple ear infections.     Changes in ear wax and lining due to aging - As people get older, their ear wax gets harder and thicker. This makes it difficult for the wax to move out of the ear as it should.     Bad ear-cleaning habits - Some people try to clean their ears using cotton swabs (Q-Tips) or other tools. This can actually push the wax deeper into the ear instead of getting it out. Over time, this can cause ear wax impaction.     Making too much ear wax - Some people make more ear wax than others. This can happen when water gets trapped in the ear, or when the ear is injured. But some people have a lot of ear wax for no obvious reason.     What are the symptoms of ear wax impaction?  The symptoms include:     Trouble hearing     Pain in the ear     Hearing a ringing noise in the ear     Feeling like the ear is blocked or plugged     These symptoms can happen in one or both ears.     Should I see a doctor or nurse?  Yes. If you or your child has any " "of these symptoms, see a doctor or nurse. They can check the insides of the ears to figure out if the symptoms are caused by ear wax impaction or another problem, such as an ear infection.     Should I clean my (or my child's) ears at home?  No. The insides of the ears do not usually need to be cleaned. Sticking anything into the ears can push the wax in deeper and cause impaction.     \"Ear candling\" involves lighting one end of a hollow candle, and putting the other end in the ear. Ear candling is not recommended for ear wax removal. It does not remove ear wax and can even cause ear injuries and burns.     How is ear wax impaction treated?  There are several treatments to remove impacted ear wax. Doctors and nurses offer these treatments only to people who have bothersome symptoms. They do not recommend treatments for removing ear wax in people who have no symptoms, even if their ears are impacted.     In some cases, doctors and nurses will remove ear wax in people whose ears are impacted and who aren't able to let others know if they have symptoms or not. This can include young children, and people who are confused or have trouble speaking, including some older adults.     There are several different ways to remove ear wax:     Ear drops - Special ear drops can soften ear wax and help it to drain out. Ear drops are not usually safe for people with an ear infection or damage to the eardrum.     Rinsing - In some cases, a doctor or nurse can remove impacted ear wax by squirting water (or a special liquid) into the ear to rinse it out.     Special tools - A doctor or nurse might use a special tool to remove ear wax. There are different types of tools that can do this safely. These include small sticks, hooks, and spoons. There are also tools that use suction to pull the wax out.                        "

## 2025-07-11 ENCOUNTER — APPOINTMENT (OUTPATIENT)
Dept: PRIMARY CARE | Facility: CLINIC | Age: 75
End: 2025-07-11
Payer: MEDICARE

## 2025-08-05 ENCOUNTER — TELEPHONE (OUTPATIENT)
Dept: PRIMARY CARE | Facility: CLINIC | Age: 75
End: 2025-08-05
Payer: MEDICARE

## 2025-08-05 NOTE — TELEPHONE ENCOUNTER
Patient would like to know if he can get a referral for a new respiratory therapist in [facility]. His current respiratory therapist is retiring.       Please advise.

## 2025-08-06 DIAGNOSIS — G47.33 OBSTRUCTIVE SLEEP APNEA (ADULT) (PEDIATRIC): ICD-10-CM

## 2025-08-06 NOTE — TELEPHONE ENCOUNTER
Correct. He was seeing  but he is retiring and he is not giving any recommendations, was notified to contact his pcp.

## 2025-08-20 ENCOUNTER — APPOINTMENT (OUTPATIENT)
Dept: PRIMARY CARE | Facility: CLINIC | Age: 75
End: 2025-08-20
Payer: MEDICARE

## 2025-08-20 VITALS
DIASTOLIC BLOOD PRESSURE: 48 MMHG | TEMPERATURE: 97.2 F | OXYGEN SATURATION: 94 % | SYSTOLIC BLOOD PRESSURE: 104 MMHG | BODY MASS INDEX: 32.47 KG/M2 | HEART RATE: 78 BPM | WEIGHT: 232.8 LBS

## 2025-08-20 DIAGNOSIS — E78.00 HYPERCHOLESTEREMIA: ICD-10-CM

## 2025-08-20 DIAGNOSIS — Z79.4 TYPE 2 DIABETES MELLITUS WITHOUT COMPLICATION, WITH LONG-TERM CURRENT USE OF INSULIN: Primary | ICD-10-CM

## 2025-08-20 DIAGNOSIS — I73.9 PVD (PERIPHERAL VASCULAR DISEASE): ICD-10-CM

## 2025-08-20 DIAGNOSIS — E55.9 VITAMIN D DEFICIENCY: ICD-10-CM

## 2025-08-20 DIAGNOSIS — E66.09 CLASS 1 OBESITY DUE TO EXCESS CALORIES WITH SERIOUS COMORBIDITY AND BODY MASS INDEX (BMI) OF 32.0 TO 32.9 IN ADULT: ICD-10-CM

## 2025-08-20 DIAGNOSIS — I10 HYPERTENSION, UNSPECIFIED TYPE: ICD-10-CM

## 2025-08-20 DIAGNOSIS — E11.9 TYPE 2 DIABETES MELLITUS WITHOUT COMPLICATION, WITH LONG-TERM CURRENT USE OF INSULIN: Primary | ICD-10-CM

## 2025-08-20 DIAGNOSIS — E66.811 CLASS 1 OBESITY DUE TO EXCESS CALORIES WITH SERIOUS COMORBIDITY AND BODY MASS INDEX (BMI) OF 32.0 TO 32.9 IN ADULT: ICD-10-CM

## 2025-08-20 DIAGNOSIS — G25.0 ESSENTIAL TREMOR: ICD-10-CM

## 2025-08-20 DIAGNOSIS — I25.810 CORONARY ARTERY DISEASE INVOLVING CORONARY BYPASS GRAFT OF NATIVE HEART WITHOUT ANGINA PECTORIS: ICD-10-CM

## 2025-08-20 DIAGNOSIS — C61 MALIGNANT NEOPLASM OF PROSTATE (MULTI): ICD-10-CM

## 2025-08-20 DIAGNOSIS — S98.132A AMPUTATION OF TOE OF LEFT FOOT: ICD-10-CM

## 2025-08-20 DIAGNOSIS — C64.9 RENAL CELL CARCINOMA, UNSPECIFIED LATERALITY: ICD-10-CM

## 2025-08-20 PROCEDURE — 1160F RVW MEDS BY RX/DR IN RCRD: CPT | Performed by: INTERNAL MEDICINE

## 2025-08-20 PROCEDURE — G2211 COMPLEX E/M VISIT ADD ON: HCPCS | Performed by: INTERNAL MEDICINE

## 2025-08-20 PROCEDURE — 3078F DIAST BP <80 MM HG: CPT | Performed by: INTERNAL MEDICINE

## 2025-08-20 PROCEDURE — 4010F ACE/ARB THERAPY RXD/TAKEN: CPT | Performed by: INTERNAL MEDICINE

## 2025-08-20 PROCEDURE — 3074F SYST BP LT 130 MM HG: CPT | Performed by: INTERNAL MEDICINE

## 2025-08-20 PROCEDURE — 1159F MED LIST DOCD IN RCRD: CPT | Performed by: INTERNAL MEDICINE

## 2025-08-20 PROCEDURE — 1036F TOBACCO NON-USER: CPT | Performed by: INTERNAL MEDICINE

## 2025-08-20 PROCEDURE — 99214 OFFICE O/P EST MOD 30 MIN: CPT | Performed by: INTERNAL MEDICINE

## 2025-08-20 RX ORDER — METOPROLOL TARTRATE 25 MG/1
25 TABLET, FILM COATED ORAL 2 TIMES DAILY
Qty: 180 TABLET | Refills: 0 | Status: SHIPPED | OUTPATIENT
Start: 2025-08-20

## 2025-09-09 ENCOUNTER — APPOINTMENT (OUTPATIENT)
Dept: ENDOCRINOLOGY | Facility: CLINIC | Age: 75
End: 2025-09-09
Payer: MEDICARE

## 2025-09-29 ENCOUNTER — APPOINTMENT (OUTPATIENT)
Dept: PRIMARY CARE | Facility: CLINIC | Age: 75
End: 2025-09-29
Payer: MEDICARE

## 2025-10-07 ENCOUNTER — APPOINTMENT (OUTPATIENT)
Dept: NEUROLOGY | Facility: CLINIC | Age: 75
End: 2025-10-07
Payer: MEDICARE

## 2025-11-17 ENCOUNTER — APPOINTMENT (OUTPATIENT)
Dept: PRIMARY CARE | Facility: CLINIC | Age: 75
End: 2025-11-17
Payer: MEDICARE

## 2026-07-08 ENCOUNTER — APPOINTMENT (OUTPATIENT)
Dept: OTOLARYNGOLOGY | Facility: CLINIC | Age: 76
End: 2026-07-08
Payer: MEDICARE